# Patient Record
Sex: MALE | Race: WHITE | NOT HISPANIC OR LATINO | Employment: FULL TIME | URBAN - METROPOLITAN AREA
[De-identification: names, ages, dates, MRNs, and addresses within clinical notes are randomized per-mention and may not be internally consistent; named-entity substitution may affect disease eponyms.]

---

## 2022-09-19 ENCOUNTER — TELEPHONE (OUTPATIENT)
Dept: SCHEDULING | Facility: IMAGING CENTER | Age: 57
End: 2022-09-19

## 2022-10-07 ENCOUNTER — OFFICE VISIT (OUTPATIENT)
Dept: MEDICAL GROUP | Facility: PHYSICIAN GROUP | Age: 57
End: 2022-10-07
Payer: COMMERCIAL

## 2022-10-07 VITALS
WEIGHT: 167.5 LBS | TEMPERATURE: 98.1 F | BODY MASS INDEX: 27.91 KG/M2 | SYSTOLIC BLOOD PRESSURE: 108 MMHG | HEART RATE: 74 BPM | OXYGEN SATURATION: 96 % | HEIGHT: 65 IN | DIASTOLIC BLOOD PRESSURE: 70 MMHG

## 2022-10-07 DIAGNOSIS — Z00.00 PREVENTATIVE HEALTH CARE: ICD-10-CM

## 2022-10-07 DIAGNOSIS — G89.29 CHRONIC PAIN OF RIGHT KNEE: ICD-10-CM

## 2022-10-07 DIAGNOSIS — M25.561 CHRONIC PAIN OF RIGHT KNEE: ICD-10-CM

## 2022-10-07 DIAGNOSIS — Z95.5 S/P PRIMARY ANGIOPLASTY WITH CORONARY STENT: ICD-10-CM

## 2022-10-07 DIAGNOSIS — F17.210 TOBACCO DEPENDENCE DUE TO CIGARETTES: ICD-10-CM

## 2022-10-07 DIAGNOSIS — Z12.2 SCREENING FOR LUNG CANCER: ICD-10-CM

## 2022-10-07 DIAGNOSIS — Z12.5 PROSTATE CANCER SCREENING: ICD-10-CM

## 2022-10-07 DIAGNOSIS — Z23 NEED FOR VACCINATION: ICD-10-CM

## 2022-10-07 DIAGNOSIS — L57.0 ACTINIC KERATOSIS: ICD-10-CM

## 2022-10-07 DIAGNOSIS — Z12.11 COLON CANCER SCREENING: ICD-10-CM

## 2022-10-07 DIAGNOSIS — K42.9 UMBILICAL HERNIA WITHOUT OBSTRUCTION AND WITHOUT GANGRENE: ICD-10-CM

## 2022-10-07 DIAGNOSIS — I25.10 CORONARY ARTERY DISEASE INVOLVING NATIVE CORONARY ARTERY OF NATIVE HEART WITHOUT ANGINA PECTORIS: ICD-10-CM

## 2022-10-07 DIAGNOSIS — E78.2 MIXED HYPERLIPIDEMIA: ICD-10-CM

## 2022-10-07 PROCEDURE — 17000 DESTRUCT PREMALG LESION: CPT | Performed by: FAMILY MEDICINE

## 2022-10-07 PROCEDURE — 90471 IMMUNIZATION ADMIN: CPT | Performed by: FAMILY MEDICINE

## 2022-10-07 PROCEDURE — 99204 OFFICE O/P NEW MOD 45 MIN: CPT | Mod: 25 | Performed by: FAMILY MEDICINE

## 2022-10-07 PROCEDURE — 90715 TDAP VACCINE 7 YRS/> IM: CPT | Performed by: FAMILY MEDICINE

## 2022-10-07 RX ORDER — ASPIRIN 81 MG/1
81 TABLET ORAL DAILY
Qty: 90 TABLET | Refills: 3
Start: 2022-10-07

## 2022-10-07 RX ORDER — EZETIMIBE 10 MG/1
10 TABLET ORAL DAILY
Qty: 30 TABLET | Refills: 3 | Status: SHIPPED | OUTPATIENT
Start: 2022-10-07 | End: 2023-01-24

## 2022-10-07 ASSESSMENT — PATIENT HEALTH QUESTIONNAIRE - PHQ9: CLINICAL INTERPRETATION OF PHQ2 SCORE: 0

## 2022-10-07 NOTE — PROGRESS NOTES
"CHIEF COMPLAINT / REASON FOR VISIT  Armen Santiago is a 57 y.o. male that presents today to establish care.    HISTORY OF PRESENT ILLNESS  Right knee still bothering him since partial replacement two years ago. Sitting for long periods, or if he over exerts with walking. Tylenol is somewhat effective    Had vocal cord \"irregularity\", has hoarse voice, had laryngoscope in [place], was scheduled for surgery but timing didn't work out    Has cardiology appointment scheduled for October 31st.    - was on atorvastatin, memory was worse, so stopped 1.5 mo ago and saw improvement within a week or two    Past Medical History  Myocardial infarction in 2018 (s/p single stent)    Hx umbilical hernia repaired in childhood, returned 1-2 years ago. 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    PHYSICAL EXAM  Constitutional: Sitting comfortably, in no acute distress, responds to questions appropriately.  Head: Normocephalic  Eyes: No conjunctival injection, no scleral icterus, PERRL  Ears: External ear canals clear, TMs pearly grey with visualized bony landmarks and crisp light reflex  Mouth: Oral mucosa moist  Throat: Oropharynx clear without erythema or tonsillar exudates  Neck: No cervical lymphadenopathy  Heart: Regular S1 S2, no murmurs, rub, or gallops  Lungs: inspiratory stridor throughout, no wheezes, rales, or rhonchi  Extremities: No lower " extremity edema. 2+ symmetric radial pulses  MSK: Vertical scar anterior knee, no joint line tenderness, no patellar tenderness, no popliteal tenderness  Skin: Warm and dry, rough erythematous macule 4 mm right ear superior helix    ASSESSMENT & PLAN  1. Coronary artery disease involving native coronary artery of native heart without angina pectoris  2. S/P primary angioplasty with coronary stent  3. Mixed hyperlipidemia  History of MI in 2018, underwent single stent placement.  Did not tolerate atorvastatin due to memory issues.  Will initiate ezetimibe 10 mg daily.  Continues to take metoprolol tartrate 25 mg twice daily and aspirin 81 mg daily.  He is planning on establishing care with a cardiologist, first appointment later this month  - Lipid Profile; Future  - APOLIPOPROTEIN B; Future  - CBC WITH DIFFERENTIAL; Future  - Comp Metabolic Panel; Future  - ezetimibe (ZETIA) 10 MG Tab; Take 1 Tablet by mouth every day.  Dispense: 30 Tablet; Refill: 3    4. Umbilical hernia without obstruction and without gangrene  Nonreducible umbilical hernia, underwent repair many years ago but the hernia has reappeared.  He is interested in surgical repair  - Referral to General Surgery    5. Chronic pain of right knee  History of right partial knee replacement.  Has chronic pain that continues in that knee, is interested in orthopedics referral.  - DX-KNEE 3 VIEWS RIGHT; Future    6. Actinic keratosis  Right ear, performed cryotherapy (see procedure note)    7. Tobacco dependence due to cigarettes  Interested in quitting, will try Chantix  - varenicline (CHANTIX STARTING MONTH PAK) 0.5 MG X 11 & 1 MG X 42 tablet; Take 1 mg by mouth 2 times a day.  Dispense: 56 Each; Refill: 3    8. Screening for lung cancer  Current smoker with over 30-pack-year history.  - CT-CHEST LOW DOSE W/O; Future    9. Prostate cancer screening  - PROSTATE SPECIFIC AG SCREENING; Future    10. Colon cancer screening  Never had colon cancer screening  -  Referral to GI for Colonoscopy    11. Need for vaccination  - TDAP VACCINE =>8YO IM    12. Preventative health care  - Lipid Profile; Future  - APOLIPOPROTEIN B; Future  - CBC WITH DIFFERENTIAL; Future  - Comp Metabolic Panel; Future      Follow-up in 2 weeks after labs

## 2022-10-07 NOTE — PROCEDURES
Cryotherapy for actinic keratosis    Date/Time: 10/7/2022 11:50 AM  Performed by: Umesh Allen M.D.  Authorized by: Umesh Allen M.D.     Number of Lesions: 1  Lesion 1:     Body area: head/neck    Head/neck location: R ear    Initial size (mm): 4    Malignancy comment: pre-malignant (actinic keratosis)    Destruction method: cryotherapy      Cryo cycles: 2

## 2022-10-11 ENCOUNTER — APPOINTMENT (OUTPATIENT)
Dept: RADIOLOGY | Facility: MEDICAL CENTER | Age: 57
End: 2022-10-11
Attending: FAMILY MEDICINE
Payer: COMMERCIAL

## 2022-10-11 DIAGNOSIS — G89.29 CHRONIC PAIN OF RIGHT KNEE: ICD-10-CM

## 2022-10-11 DIAGNOSIS — M25.561 CHRONIC PAIN OF RIGHT KNEE: ICD-10-CM

## 2022-10-11 PROCEDURE — 73562 X-RAY EXAM OF KNEE 3: CPT | Mod: RT

## 2022-10-31 ENCOUNTER — HOSPITAL ENCOUNTER (OUTPATIENT)
Dept: LAB | Facility: MEDICAL CENTER | Age: 57
End: 2022-10-31
Attending: FAMILY MEDICINE
Payer: COMMERCIAL

## 2022-10-31 DIAGNOSIS — Z12.5 PROSTATE CANCER SCREENING: ICD-10-CM

## 2022-10-31 DIAGNOSIS — I25.10 CORONARY ARTERY DISEASE INVOLVING NATIVE CORONARY ARTERY OF NATIVE HEART WITHOUT ANGINA PECTORIS: ICD-10-CM

## 2022-10-31 DIAGNOSIS — Z00.00 PREVENTATIVE HEALTH CARE: ICD-10-CM

## 2022-10-31 DIAGNOSIS — E78.2 MIXED HYPERLIPIDEMIA: ICD-10-CM

## 2022-10-31 LAB
ALBUMIN SERPL BCP-MCNC: 4.4 G/DL (ref 3.2–4.9)
ALBUMIN/GLOB SERPL: 1.5 G/DL
ALP SERPL-CCNC: 67 U/L (ref 30–99)
ALT SERPL-CCNC: 20 U/L (ref 2–50)
ANION GAP SERPL CALC-SCNC: 11 MMOL/L (ref 7–16)
AST SERPL-CCNC: 16 U/L (ref 12–45)
BASOPHILS # BLD AUTO: 0.9 % (ref 0–1.8)
BASOPHILS # BLD: 0.07 K/UL (ref 0–0.12)
BILIRUB SERPL-MCNC: 0.4 MG/DL (ref 0.1–1.5)
BUN SERPL-MCNC: 14 MG/DL (ref 8–22)
CALCIUM SERPL-MCNC: 9.4 MG/DL (ref 8.5–10.5)
CHLORIDE SERPL-SCNC: 103 MMOL/L (ref 96–112)
CHOLEST SERPL-MCNC: 292 MG/DL (ref 100–199)
CO2 SERPL-SCNC: 20 MMOL/L (ref 20–33)
CREAT SERPL-MCNC: 0.71 MG/DL (ref 0.5–1.4)
EOSINOPHIL # BLD AUTO: 0.18 K/UL (ref 0–0.51)
EOSINOPHIL NFR BLD: 2.2 % (ref 0–6.9)
ERYTHROCYTE [DISTWIDTH] IN BLOOD BY AUTOMATED COUNT: 48.8 FL (ref 35.9–50)
FASTING STATUS PATIENT QL REPORTED: NORMAL
GFR SERPLBLD CREATININE-BSD FMLA CKD-EPI: 105 ML/MIN/1.73 M 2
GLOBULIN SER CALC-MCNC: 3 G/DL (ref 1.9–3.5)
GLUCOSE SERPL-MCNC: 99 MG/DL (ref 65–99)
HCT VFR BLD AUTO: 51.6 % (ref 42–52)
HDLC SERPL-MCNC: 30 MG/DL
HGB BLD-MCNC: 17.5 G/DL (ref 14–18)
IMM GRANULOCYTES # BLD AUTO: 0.03 K/UL (ref 0–0.11)
IMM GRANULOCYTES NFR BLD AUTO: 0.4 % (ref 0–0.9)
LDLC SERPL CALC-MCNC: 214 MG/DL
LYMPHOCYTES # BLD AUTO: 2.43 K/UL (ref 1–4.8)
LYMPHOCYTES NFR BLD: 30 % (ref 22–41)
MCH RBC QN AUTO: 33.5 PG (ref 27–33)
MCHC RBC AUTO-ENTMCNC: 33.9 G/DL (ref 33.7–35.3)
MCV RBC AUTO: 98.9 FL (ref 81.4–97.8)
MONOCYTES # BLD AUTO: 0.65 K/UL (ref 0–0.85)
MONOCYTES NFR BLD AUTO: 8 % (ref 0–13.4)
NEUTROPHILS # BLD AUTO: 4.75 K/UL (ref 1.82–7.42)
NEUTROPHILS NFR BLD: 58.5 % (ref 44–72)
NRBC # BLD AUTO: 0 K/UL
NRBC BLD-RTO: 0 /100 WBC
PLATELET # BLD AUTO: 324 K/UL (ref 164–446)
PMV BLD AUTO: 9.2 FL (ref 9–12.9)
POTASSIUM SERPL-SCNC: 4.4 MMOL/L (ref 3.6–5.5)
PROT SERPL-MCNC: 7.4 G/DL (ref 6–8.2)
PSA SERPL-MCNC: 1.03 NG/ML (ref 0–4)
RBC # BLD AUTO: 5.22 M/UL (ref 4.7–6.1)
SODIUM SERPL-SCNC: 134 MMOL/L (ref 135–145)
TRIGL SERPL-MCNC: 242 MG/DL (ref 0–149)
WBC # BLD AUTO: 8.1 K/UL (ref 4.8–10.8)

## 2022-10-31 PROCEDURE — 36415 COLL VENOUS BLD VENIPUNCTURE: CPT

## 2022-10-31 PROCEDURE — 82172 ASSAY OF APOLIPOPROTEIN: CPT

## 2022-10-31 PROCEDURE — 84153 ASSAY OF PSA TOTAL: CPT

## 2022-10-31 PROCEDURE — 80053 COMPREHEN METABOLIC PANEL: CPT

## 2022-10-31 PROCEDURE — 85025 COMPLETE CBC W/AUTO DIFF WBC: CPT

## 2022-10-31 PROCEDURE — 80061 LIPID PANEL: CPT

## 2022-11-02 LAB — APO B100 SERPL-MCNC: 182 MG/DL (ref 55–140)

## 2022-11-29 ENCOUNTER — APPOINTMENT (OUTPATIENT)
Dept: MEDICAL GROUP | Facility: PHYSICIAN GROUP | Age: 57
End: 2022-11-29
Payer: COMMERCIAL

## 2023-02-06 ENCOUNTER — APPOINTMENT (OUTPATIENT)
Dept: RADIOLOGY | Facility: IMAGING CENTER | Age: 58
End: 2023-02-06
Attending: FAMILY MEDICINE
Payer: COMMERCIAL

## 2023-02-06 ENCOUNTER — OFFICE VISIT (OUTPATIENT)
Dept: MEDICAL GROUP | Facility: PHYSICIAN GROUP | Age: 58
End: 2023-02-06
Payer: COMMERCIAL

## 2023-02-06 VITALS
OXYGEN SATURATION: 95 % | HEIGHT: 65 IN | RESPIRATION RATE: 14 BRPM | DIASTOLIC BLOOD PRESSURE: 82 MMHG | TEMPERATURE: 96.9 F | WEIGHT: 172 LBS | SYSTOLIC BLOOD PRESSURE: 134 MMHG | HEART RATE: 60 BPM | BODY MASS INDEX: 28.66 KG/M2

## 2023-02-06 DIAGNOSIS — E78.00 HYPERCHOLESTEROLEMIA WITH LDL GREATER THAN 190 MG/DL: ICD-10-CM

## 2023-02-06 DIAGNOSIS — E78.2 MIXED HYPERLIPIDEMIA: ICD-10-CM

## 2023-02-06 DIAGNOSIS — M25.511 CHRONIC RIGHT SHOULDER PAIN: ICD-10-CM

## 2023-02-06 DIAGNOSIS — I25.10 CORONARY ARTERY DISEASE INVOLVING NATIVE CORONARY ARTERY OF NATIVE HEART WITHOUT ANGINA PECTORIS: ICD-10-CM

## 2023-02-06 DIAGNOSIS — Z78.9 STATIN INTOLERANCE: ICD-10-CM

## 2023-02-06 DIAGNOSIS — D75.89 MACROCYTOSIS WITHOUT ANEMIA: ICD-10-CM

## 2023-02-06 DIAGNOSIS — G89.29 CHRONIC RIGHT SHOULDER PAIN: ICD-10-CM

## 2023-02-06 PROCEDURE — 99214 OFFICE O/P EST MOD 30 MIN: CPT | Performed by: FAMILY MEDICINE

## 2023-02-06 PROCEDURE — 73030 X-RAY EXAM OF SHOULDER: CPT | Mod: TC,RT | Performed by: NURSE PRACTITIONER

## 2023-02-06 RX ORDER — EVOLOCUMAB 420 MG/3.5
420 KIT SUBCUTANEOUS
Qty: 3.5 ML | Refills: 3 | Status: SHIPPED | OUTPATIENT
Start: 2023-02-06 | End: 2023-12-26

## 2023-02-06 ASSESSMENT — PATIENT HEALTH QUESTIONNAIRE - PHQ9: CLINICAL INTERPRETATION OF PHQ2 SCORE: 0

## 2023-02-06 ASSESSMENT — FIBROSIS 4 INDEX: FIB4 SCORE: 0.65

## 2023-02-06 NOTE — PROGRESS NOTES
"CHIEF COMPLAINT / REASON FOR VISIT  Armen Santiago is a 57 y.o. male with history of coronary artery disease (MI in 2018 status post stent) that presents today for lab follow-up    HISTORY OF PRESENT ILLNESS  Labs 10/31/2022:  CBC shows macrocytosis with MCV 98.9  CMP grossly normal  LDL-C 214, apo B 182  PSA 1.03    Zero to 6 drinks per night    Right shoulder pain, years. Worse in past couple months. Aggravated when he stops moving his shoulder. Wakes him up from sleep.    OBJECTIVE     /82 (BP Location: Right arm, Patient Position: Sitting, BP Cuff Size: Adult)   Pulse 60   Temp 36.1 °C (96.9 °F) (Temporal)   Resp 14   Ht 1.651 m (5' 5\")   Wt 78 kg (172 lb)   SpO2 95%   BMI 28.62 kg/m²  Body mass index is 28.62 kg/m².    PHYSICAL EXAM  Constitutional: Sitting comfortably, in no acute distress, responds to questions appropriately.  Right Shoulder:   -- Inspection: No erythema, scars, obvious deformities, or atrophy.  -- Tenderness to palpation at anterior joint line and bicipital groove  -- Active range of motion: 160 degrees shoulder flexion, 160 abduction, midthoracic internal  -- Normal sensation to light touch in upper extremities  -- 5/5 muscle strength in shoulder external rotation, internal rotation, abduction. 5/5 in biceps flexion and extension  -- Special tests: Negative Shelby, negative Neer, negative empty Can, positive Speed's, negative Yergason'    ASSESSMENT & PLAN  1. Coronary artery disease involving native coronary artery of native heart without angina pectoris  2. Hypercholesterolemia with LDL greater than 190 mg/dL  3. Mixed hyperlipidemia  4. Statin intolerance  Severe hypercholesterolemia with LDL-C 214 and patient with history of coronary artery disease and myocardial infarction.  His goal LDL-C is less than 55 mg/dL.  He did not tolerate statin due to adverse memory/cognition effects.  Currently only on ezetimibe 10 mg daily.  We will initiate Repatha 420 mg monthly to " achieve necessary LDL lowering.  - Evolocumab with Infusor (REPATHA PUSHTRONEX SYSTEM) 420 MG/3.5ML Solution Cartridge; Inject 420 mg under the skin every 30 (thirty) days.  Dispense: 3.5 mL; Refill: 3  - Lipid Profile; Future    5. Chronic right shoulder pain  Differential includes glenohumeral arthritis, rotator cuff tendinopathy, or bicipital tendinitis.  Will obtain radiographs for further evaluation.  Refer for physical therapy.  Next step in terms of referral/injection will depend on radiograph results  - DX-SHOULDER 2+ RIGHT; Future  - Referral to Physical Therapy    6. Macrocytosis without anemia  Suspect due to alcohol use, recommended he cut back or quit alcohol

## 2023-04-06 ENCOUNTER — OFFICE VISIT (OUTPATIENT)
Dept: SPORTS MEDICINE | Facility: CLINIC | Age: 58
End: 2023-04-06
Payer: COMMERCIAL

## 2023-04-06 ENCOUNTER — APPOINTMENT (OUTPATIENT)
Dept: RADIOLOGY | Facility: IMAGING CENTER | Age: 58
End: 2023-04-06
Attending: FAMILY MEDICINE
Payer: COMMERCIAL

## 2023-04-06 VITALS
TEMPERATURE: 98.8 F | RESPIRATION RATE: 18 BRPM | DIASTOLIC BLOOD PRESSURE: 80 MMHG | HEIGHT: 65 IN | BODY MASS INDEX: 28.66 KG/M2 | WEIGHT: 172 LBS | HEART RATE: 79 BPM | SYSTOLIC BLOOD PRESSURE: 118 MMHG | OXYGEN SATURATION: 94 %

## 2023-04-06 DIAGNOSIS — M25.512 ACUTE PAIN OF LEFT SHOULDER: ICD-10-CM

## 2023-04-06 PROCEDURE — 73030 X-RAY EXAM OF SHOULDER: CPT | Mod: TC,LT | Performed by: FAMILY MEDICINE

## 2023-04-06 PROCEDURE — 99213 OFFICE O/P EST LOW 20 MIN: CPT | Performed by: FAMILY MEDICINE

## 2023-04-06 ASSESSMENT — FIBROSIS 4 INDEX: FIB4 SCORE: 0.66
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
## 2023-04-06 NOTE — Clinical Note
Carlos Calvo, Thanks for referring Celio to our sports medicine clinic. You initially referred him for his right shoulder, but since he was having significant pain in the LEFT and his right shoulder was feeling better we focused on his left shoulder. I think he may have an acute tear of his infraspinatus. We have elected to see how he does with some exercises and formal therapy. We plan on seeing him back in about 3 weeks. If symptoms worsen, we may consider MRI study for assessment of the infraspinatus. Hope you are well! Respectfully,  FARIDA Navarro M.D. [facility] [phone]

## 2023-09-08 ENCOUNTER — TELEPHONE (OUTPATIENT)
Dept: HEALTH INFORMATION MANAGEMENT | Facility: OTHER | Age: 58
End: 2023-09-08

## 2023-09-08 DIAGNOSIS — M25.512 ACUTE PAIN OF LEFT SHOULDER: ICD-10-CM

## 2023-09-08 NOTE — TELEPHONE ENCOUNTER
"1. Caller Name: Armen Santiago                          Call Back Number: [phone]        How would the patient prefer to be contacted with a response: MyChart message    2. SPECIFIC Action To Be Taken: Referral pending, please sign.    3. Diagnosis/Clinical Reason for Request: Right Shoulder Pian - \"Hi would appreciate if I could get a referral for [facility] we initially went and saw the doc that was referred by Dr. Allen we were not impressed and got nowhere in regards to dealing with the shoulder pain\"    4. Specialty & Provider Name/Lab/Imaging Location: [facility]     5. Is appointment scheduled for requested order/referral: no    Patient was informed they will receive a return phone call from the office ONLY if there are any questions before processing their request. Advised to call back if they haven't received a call from the referral department in 5 days.  "

## 2023-12-22 PROBLEM — S46.011A TRAUMATIC TEAR OF RIGHT ROTATOR CUFF, INITIAL ENCOUNTER: Status: ACTIVE | Noted: 2023-12-22

## 2023-12-22 PROBLEM — M75.41 SUBACROMIAL IMPINGEMENT OF RIGHT SHOULDER: Status: ACTIVE | Noted: 2023-12-22

## 2023-12-22 PROBLEM — M75.21 BICEPS TENDONITIS ON RIGHT: Status: ACTIVE | Noted: 2023-12-22

## 2023-12-22 PROBLEM — S43.431A GLENOID LABRAL TEAR, RIGHT, INITIAL ENCOUNTER: Status: ACTIVE | Noted: 2023-12-22

## 2023-12-26 ASSESSMENT — FIBROSIS 4 INDEX: FIB4 SCORE: 0.66

## 2024-01-09 ENCOUNTER — OFFICE VISIT (OUTPATIENT)
Dept: MEDICAL GROUP | Facility: PHYSICIAN GROUP | Age: 59
End: 2024-01-09
Payer: COMMERCIAL

## 2024-01-09 VITALS
BODY MASS INDEX: 27.64 KG/M2 | RESPIRATION RATE: 14 BRPM | OXYGEN SATURATION: 95 % | SYSTOLIC BLOOD PRESSURE: 108 MMHG | WEIGHT: 172 LBS | HEART RATE: 67 BPM | TEMPERATURE: 97.3 F | HEIGHT: 66 IN | DIASTOLIC BLOOD PRESSURE: 62 MMHG

## 2024-01-09 DIAGNOSIS — M25.511 BILATERAL SHOULDER PAIN, UNSPECIFIED CHRONICITY: ICD-10-CM

## 2024-01-09 DIAGNOSIS — Z78.9 STATIN INTOLERANCE: ICD-10-CM

## 2024-01-09 DIAGNOSIS — M25.512 BILATERAL SHOULDER PAIN, UNSPECIFIED CHRONICITY: ICD-10-CM

## 2024-01-09 DIAGNOSIS — Z23 NEED FOR VACCINATION: ICD-10-CM

## 2024-01-09 DIAGNOSIS — F11.90 OPIATE USE: ICD-10-CM

## 2024-01-09 DIAGNOSIS — I25.10 CORONARY ARTERY DISEASE INVOLVING NATIVE CORONARY ARTERY OF NATIVE HEART WITHOUT ANGINA PECTORIS: ICD-10-CM

## 2024-01-09 DIAGNOSIS — E78.00 HYPERCHOLESTEROLEMIA WITH LDL GREATER THAN 190 MG/DL: ICD-10-CM

## 2024-01-09 PROCEDURE — 3074F SYST BP LT 130 MM HG: CPT | Performed by: FAMILY MEDICINE

## 2024-01-09 PROCEDURE — 99214 OFFICE O/P EST MOD 30 MIN: CPT | Mod: 25 | Performed by: FAMILY MEDICINE

## 2024-01-09 PROCEDURE — 3078F DIAST BP <80 MM HG: CPT | Performed by: FAMILY MEDICINE

## 2024-01-09 PROCEDURE — 90686 IIV4 VACC NO PRSV 0.5 ML IM: CPT | Performed by: FAMILY MEDICINE

## 2024-01-09 PROCEDURE — 90471 IMMUNIZATION ADMIN: CPT | Performed by: FAMILY MEDICINE

## 2024-01-09 RX ORDER — EVOLOCUMAB 140 MG/ML
140 INJECTION, SOLUTION SUBCUTANEOUS
Qty: 2 ML | Refills: 3 | Status: SHIPPED | OUTPATIENT
Start: 2024-01-09 | End: 2024-03-08 | Stop reason: SDUPTHER

## 2024-01-09 RX ORDER — TRAMADOL HYDROCHLORIDE 50 MG/1
50 TABLET ORAL 2 TIMES DAILY PRN
Qty: 60 TABLET | Refills: 0 | Status: SHIPPED | OUTPATIENT
Start: 2024-01-09 | End: 2024-01-29 | Stop reason: SDUPTHER

## 2024-01-09 ASSESSMENT — PATIENT HEALTH QUESTIONNAIRE - PHQ9: CLINICAL INTERPRETATION OF PHQ2 SCORE: 0

## 2024-01-09 ASSESSMENT — FIBROSIS 4 INDEX: FIB4 SCORE: 0.66

## 2024-01-09 NOTE — PROGRESS NOTES
"CHIEF COMPLAINT / REASON FOR VISIT  Armen Santiago is a 58 y.o. male that presents today for   Chief Complaint   Patient presents with    Medical Clearance     Shoulder surg      HISTORY OF PRESENT ILLNESS  MRI 11/6/2023 right shoulder showed acute full tear of supraspinatus and subscapularis, medial dislocation of long head of biceps tendon out of the bicipital groove, infraspinatus tear, and glenohumeral/acromioclavicular arthritis.  MRI 11/6/2023 left shoulder showed chronic rotator cuff tear.  He saw orthopedics and recommended reverse total shoulder arthroplasty for the left, and scope with rotator cuff repair and biceps tenodesis on the right.    He has tried ibuprofen and celebrex    Denies exertional chest pain or dyspnea    OBJECTIVE     /62 (BP Location: Left arm, Patient Position: Sitting, BP Cuff Size: Adult)   Pulse 67   Temp 36.3 °C (97.3 °F) (Temporal)   Resp 14   Ht 1.676 m (5' 6\")   Wt 78 kg (172 lb)   SpO2 95%   BMI 27.76 kg/m²      PHYSICAL EXAM  Constitutional: Sitting comfortably, in no acute distress, responds to questions appropriately.  Skin: Warm and dry, no rashes or lesions on face or exposed upper extremities    ASSESSMENT & PLAN  1. Bilateral shoulder pain, unspecified chronicity  2. Opiate use  Chronic bilateral severe shoulder pain, uncontrolled with Celebrex and Tylenol.  He is requesting tramadol to bridge him to his upcoming orthopedic shoulder surgery.  After discussion decided to proceed with tramadol 50 mg twice daily.  He is aware of associated risks  - traMADol (ULTRAM) 50 MG Tab; Take 1 Tablet by mouth 2 times a day as needed for Severe Pain for up to 30 days.  Dispense: 60 Tablet; Refill: 0  - Consent for Opiate Prescription  - Controlled Substance Treatment Agreement    3. Hypercholesterolemia with LDL greater than 190 mg/dL  4. Coronary artery disease involving native coronary artery of native heart without angina pectoris  5. Statin intolerance  Severe " hypercholesterolemia with LDL-C 214 and patient with history of coronary artery disease and myocardial infarction.  His goal LDL-C is less than 55 mg/dL.  He did not tolerate statin due to adverse memory/cognition effects.  Currently only on ezetimibe 10 mg daily.  We will initiate Repatha injections every 14 days for secondary ASCVD prevention, recheck lipids in 3 months. Will refer to get re-established with cardiology.   - Evolocumab (REPATHA) Solution Prefilled Syringe SubQ injection syringe; Inject 1 mL under the skin every 14 days.  Dispense: 2 mL; Refill: 3  - Lipid Profile; Future  - APOLIPOPROTEIN B; Future  - REFERRAL TO CARDIOLOGY  - CBC WITH DIFFERENTIAL; Future    6. Need for vaccination  - INFLUENZA VACCINE QUAD INJ (PF)

## 2024-01-11 ENCOUNTER — TELEPHONE (OUTPATIENT)
Dept: MEDICAL GROUP | Facility: PHYSICIAN GROUP | Age: 59
End: 2024-01-11

## 2024-01-11 NOTE — TELEPHONE ENCOUNTER
VOICEMAIL  1. Caller Name: Armen Santiago                      Call Back Number: [phone] (home) [phone] (work)      2. Message: Patient is looking to get his clearance for [facility] signed. They state that [facility] has sent a request through [employer]    3. Patient approves office to leave a detailed voicemail/MyChart message: yes

## 2024-01-11 NOTE — TELEPHONE ENCOUNTER
Phone Number Called: [phone]    Call outcome:  Left voicemail to [facility]    Message: Called [facility] and inform them that we have not received a preop clearance form for the patient.  I provided both our front office fax number and our centralized fax number.

## 2024-01-12 ENCOUNTER — HOSPITAL ENCOUNTER (OUTPATIENT)
Dept: LAB | Facility: MEDICAL CENTER | Age: 59
End: 2024-01-12
Attending: FAMILY MEDICINE
Payer: COMMERCIAL

## 2024-01-12 DIAGNOSIS — I25.10 CORONARY ARTERY DISEASE INVOLVING NATIVE CORONARY ARTERY OF NATIVE HEART WITHOUT ANGINA PECTORIS: ICD-10-CM

## 2024-01-12 DIAGNOSIS — E78.00 HYPERCHOLESTEROLEMIA WITH LDL GREATER THAN 190 MG/DL: ICD-10-CM

## 2024-01-12 DIAGNOSIS — Z78.9 STATIN INTOLERANCE: ICD-10-CM

## 2024-01-12 LAB
BASOPHILS # BLD AUTO: 0.7 % (ref 0–1.8)
BASOPHILS # BLD: 0.05 K/UL (ref 0–0.12)
CHOLEST SERPL-MCNC: 275 MG/DL (ref 100–199)
EOSINOPHIL # BLD AUTO: 0.21 K/UL (ref 0–0.51)
EOSINOPHIL NFR BLD: 3 % (ref 0–6.9)
ERYTHROCYTE [DISTWIDTH] IN BLOOD BY AUTOMATED COUNT: 47.4 FL (ref 35.9–50)
FASTING STATUS PATIENT QL REPORTED: NORMAL
HCT VFR BLD AUTO: 52 % (ref 42–52)
HDLC SERPL-MCNC: 30 MG/DL
HGB BLD-MCNC: 17.4 G/DL (ref 14–18)
IMM GRANULOCYTES # BLD AUTO: 0.03 K/UL (ref 0–0.11)
IMM GRANULOCYTES NFR BLD AUTO: 0.4 % (ref 0–0.9)
LDLC SERPL CALC-MCNC: 192 MG/DL
LYMPHOCYTES # BLD AUTO: 1.89 K/UL (ref 1–4.8)
LYMPHOCYTES NFR BLD: 27 % (ref 22–41)
MCH RBC QN AUTO: 33.3 PG (ref 27–33)
MCHC RBC AUTO-ENTMCNC: 33.5 G/DL (ref 32.3–36.5)
MCV RBC AUTO: 99.6 FL (ref 81.4–97.8)
MONOCYTES # BLD AUTO: 0.67 K/UL (ref 0–0.85)
MONOCYTES NFR BLD AUTO: 9.6 % (ref 0–13.4)
NEUTROPHILS # BLD AUTO: 4.15 K/UL (ref 1.82–7.42)
NEUTROPHILS NFR BLD: 59.3 % (ref 44–72)
NRBC # BLD AUTO: 0 K/UL
NRBC BLD-RTO: 0 /100 WBC (ref 0–0.2)
PLATELET # BLD AUTO: 280 K/UL (ref 164–446)
PMV BLD AUTO: 9.5 FL (ref 9–12.9)
RBC # BLD AUTO: 5.22 M/UL (ref 4.7–6.1)
TRIGL SERPL-MCNC: 264 MG/DL (ref 0–149)
WBC # BLD AUTO: 7 K/UL (ref 4.8–10.8)

## 2024-01-12 PROCEDURE — 36415 COLL VENOUS BLD VENIPUNCTURE: CPT

## 2024-01-12 PROCEDURE — 80061 LIPID PANEL: CPT

## 2024-01-12 PROCEDURE — 85025 COMPLETE CBC W/AUTO DIFF WBC: CPT

## 2024-01-12 PROCEDURE — 82172 ASSAY OF APOLIPOPROTEIN: CPT

## 2024-01-13 LAB — APO B100 SERPL-MCNC: 177 MG/DL (ref 66–133)

## 2024-01-15 ENCOUNTER — TELEPHONE (OUTPATIENT)
Dept: MEDICAL GROUP | Facility: PHYSICIAN GROUP | Age: 59
End: 2024-01-15

## 2024-01-16 DIAGNOSIS — I25.10 CORONARY ARTERY DISEASE INVOLVING NATIVE CORONARY ARTERY OF NATIVE HEART WITHOUT ANGINA PECTORIS: ICD-10-CM

## 2024-01-16 NOTE — TELEPHONE ENCOUNTER
Phone Number Called: [phone]     Call outcome:  Left a detailed message to Tere's voicemail    Message: Informed them that the preop clearance form has been faxed out to them.

## 2024-01-16 NOTE — TELEPHONE ENCOUNTER
----- Message from Tere Michael, Med Ass't sent at 1/15/2024  3:58 PM PST -----  Sanchez Alexandra this is Tere from [facility]'s office. I have faxed the requested paperwork over a few times to both numbers you have provided [phone] and [phone]. These were faxed on 1.10.2024,1.4.2024,12.28.2023. I believe your office is part of [facility] and that you are able to access the requested documents under letters or provided a dictated note stating this patient is cleared for surgery. If he is indeed not cleared to proceed please notify the patient.     I have also called your office and have personally left a voicemail or two and have never gotten a call back. This patient is in pain and would like to resolve the problem.     Thank You   Tere   [phone]

## 2024-01-17 RX ORDER — EZETIMIBE 10 MG/1
10 TABLET ORAL DAILY
Qty: 90 TABLET | Refills: 3 | Status: SHIPPED | OUTPATIENT
Start: 2024-01-17

## 2024-01-22 ENCOUNTER — APPOINTMENT (OUTPATIENT)
Dept: ADMISSIONS | Facility: MEDICAL CENTER | Age: 59
End: 2024-01-22
Attending: ORTHOPAEDIC SURGERY
Payer: COMMERCIAL

## 2024-01-26 ENCOUNTER — TELEPHONE (OUTPATIENT)
Dept: HEALTH INFORMATION MANAGEMENT | Facility: OTHER | Age: 59
End: 2024-01-26
Payer: COMMERCIAL

## 2024-01-29 DIAGNOSIS — M25.512 BILATERAL SHOULDER PAIN, UNSPECIFIED CHRONICITY: ICD-10-CM

## 2024-01-29 DIAGNOSIS — M25.511 BILATERAL SHOULDER PAIN, UNSPECIFIED CHRONICITY: ICD-10-CM

## 2024-01-29 RX ORDER — TRAMADOL HYDROCHLORIDE 50 MG/1
50 TABLET ORAL 2 TIMES DAILY PRN
Qty: 60 TABLET | Refills: 0 | Status: ON HOLD | OUTPATIENT
Start: 2024-01-29 | End: 2024-02-20

## 2024-01-29 NOTE — TELEPHONE ENCOUNTER
Received request via: Pharmacy    Was the patient seen in the last year in this department? Yes    Does the patient have an active prescription (recently filled or refills available) for medication(s) requested? No    Pharmacy Name: [facility] [place], [place]    Does the patient have alf Plus and need 100 day supply (blood pressure, diabetes and cholesterol meds only)? Patient does not have SCP

## 2024-02-07 ENCOUNTER — PRE-ADMISSION TESTING (OUTPATIENT)
Dept: ADMISSIONS | Facility: MEDICAL CENTER | Age: 59
End: 2024-02-07
Attending: ORTHOPAEDIC SURGERY
Payer: COMMERCIAL

## 2024-02-07 VITALS — BODY MASS INDEX: 27.76 KG/M2 | HEIGHT: 66 IN

## 2024-02-07 NOTE — PREPROCEDURE INSTRUCTIONS
Pt preadmitted via phone, instructions emailed. Questions answered.Patient instructed per pharmacy guidelines regarding taking, holding or contacting provider for instructions on regularly prescribed medications before surgery. Instructed to take the following medications the day of surgery with a sip of water per pharmacy medication guidelines- metoprolol and if needed - flexeril and tramadol. METs score >4.

## 2024-02-20 ENCOUNTER — ANESTHESIA (OUTPATIENT)
Dept: SURGERY | Facility: MEDICAL CENTER | Age: 59
End: 2024-02-20
Payer: COMMERCIAL

## 2024-02-20 ENCOUNTER — HOSPITAL ENCOUNTER (OUTPATIENT)
Facility: MEDICAL CENTER | Age: 59
End: 2024-02-20
Attending: ORTHOPAEDIC SURGERY | Admitting: ORTHOPAEDIC SURGERY
Payer: COMMERCIAL

## 2024-02-20 ENCOUNTER — ANESTHESIA EVENT (OUTPATIENT)
Dept: SURGERY | Facility: MEDICAL CENTER | Age: 59
End: 2024-02-20
Payer: COMMERCIAL

## 2024-02-20 VITALS
SYSTOLIC BLOOD PRESSURE: 147 MMHG | BODY MASS INDEX: 27.9 KG/M2 | HEIGHT: 65 IN | OXYGEN SATURATION: 92 % | RESPIRATION RATE: 18 BRPM | HEART RATE: 75 BPM | DIASTOLIC BLOOD PRESSURE: 81 MMHG | TEMPERATURE: 96.8 F | WEIGHT: 167.44 LBS

## 2024-02-20 DIAGNOSIS — M75.21 BICEPS TENDONITIS ON RIGHT: ICD-10-CM

## 2024-02-20 DIAGNOSIS — S46.011A TRAUMATIC TEAR OF RIGHT ROTATOR CUFF, INITIAL ENCOUNTER: ICD-10-CM

## 2024-02-20 DIAGNOSIS — M75.41 SUBACROMIAL IMPINGEMENT OF RIGHT SHOULDER: ICD-10-CM

## 2024-02-20 DIAGNOSIS — S43.431A GLENOID LABRAL TEAR, RIGHT, INITIAL ENCOUNTER: ICD-10-CM

## 2024-02-20 DIAGNOSIS — G89.18 POST-OP PAIN: ICD-10-CM

## 2024-02-20 PROCEDURE — 160025 RECOVERY II MINUTES (STATS): Performed by: ORTHOPAEDIC SURGERY

## 2024-02-20 PROCEDURE — 160048 HCHG OR STATISTICAL LEVEL 1-5: Performed by: ORTHOPAEDIC SURGERY

## 2024-02-20 PROCEDURE — 160036 HCHG PACU - EA ADDL 30 MINS PHASE I: Performed by: ORTHOPAEDIC SURGERY

## 2024-02-20 PROCEDURE — 700105 HCHG RX REV CODE 258: Performed by: ORTHOPAEDIC SURGERY

## 2024-02-20 PROCEDURE — 29827 SHO ARTHRS SRG RT8TR CUF RPR: CPT | Mod: RT | Performed by: ORTHOPAEDIC SURGERY

## 2024-02-20 PROCEDURE — 700101 HCHG RX REV CODE 250: Performed by: ORTHOPAEDIC SURGERY

## 2024-02-20 PROCEDURE — 160046 HCHG PACU - 1ST 60 MINS PHASE II: Performed by: ORTHOPAEDIC SURGERY

## 2024-02-20 PROCEDURE — 700101 HCHG RX REV CODE 250: Performed by: STUDENT IN AN ORGANIZED HEALTH CARE EDUCATION/TRAINING PROGRAM

## 2024-02-20 PROCEDURE — 64415 NJX AA&/STRD BRCH PLXS IMG: CPT | Performed by: ORTHOPAEDIC SURGERY

## 2024-02-20 PROCEDURE — 23430 REPAIR BICEPS TENDON: CPT | Mod: RT | Performed by: ORTHOPAEDIC SURGERY

## 2024-02-20 PROCEDURE — A9270 NON-COVERED ITEM OR SERVICE: HCPCS | Performed by: STUDENT IN AN ORGANIZED HEALTH CARE EDUCATION/TRAINING PROGRAM

## 2024-02-20 PROCEDURE — 700111 HCHG RX REV CODE 636 W/ 250 OVERRIDE (IP): Mod: JZ | Performed by: STUDENT IN AN ORGANIZED HEALTH CARE EDUCATION/TRAINING PROGRAM

## 2024-02-20 PROCEDURE — 29826 SHO ARTHRS SRG DECOMPRESSION: CPT | Mod: ASROC,RT | Performed by: PHYSICIAN ASSISTANT

## 2024-02-20 PROCEDURE — 29823 SHO ARTHRS SRG XTNSV DBRDMT: CPT | Mod: RT | Performed by: ORTHOPAEDIC SURGERY

## 2024-02-20 PROCEDURE — C1713 ANCHOR/SCREW BN/BN,TIS/BN: HCPCS | Performed by: ORTHOPAEDIC SURGERY

## 2024-02-20 PROCEDURE — 160029 HCHG SURGERY MINUTES - 1ST 30 MINS LEVEL 4: Performed by: ORTHOPAEDIC SURGERY

## 2024-02-20 PROCEDURE — 23430 REPAIR BICEPS TENDON: CPT | Mod: ASROC,RT | Performed by: PHYSICIAN ASSISTANT

## 2024-02-20 PROCEDURE — 29826 SHO ARTHRS SRG DECOMPRESSION: CPT | Mod: RT | Performed by: ORTHOPAEDIC SURGERY

## 2024-02-20 PROCEDURE — 29827 SHO ARTHRS SRG RT8TR CUF RPR: CPT | Mod: ASROC,RT | Performed by: PHYSICIAN ASSISTANT

## 2024-02-20 PROCEDURE — 160002 HCHG RECOVERY MINUTES (STAT): Performed by: ORTHOPAEDIC SURGERY

## 2024-02-20 PROCEDURE — 700111 HCHG RX REV CODE 636 W/ 250 OVERRIDE (IP): Mod: JZ | Performed by: ORTHOPAEDIC SURGERY

## 2024-02-20 PROCEDURE — 502000 HCHG MISC OR IMPLANTS RC 0278: Performed by: ORTHOPAEDIC SURGERY

## 2024-02-20 PROCEDURE — 160035 HCHG PACU - 1ST 60 MINS PHASE I: Performed by: ORTHOPAEDIC SURGERY

## 2024-02-20 PROCEDURE — 160009 HCHG ANES TIME/MIN: Performed by: ORTHOPAEDIC SURGERY

## 2024-02-20 PROCEDURE — 29823 SHO ARTHRS SRG XTNSV DBRDMT: CPT | Mod: ASROC,RT | Performed by: PHYSICIAN ASSISTANT

## 2024-02-20 PROCEDURE — 160041 HCHG SURGERY MINUTES - EA ADDL 1 MIN LEVEL 4: Performed by: ORTHOPAEDIC SURGERY

## 2024-02-20 PROCEDURE — 700102 HCHG RX REV CODE 250 W/ 637 OVERRIDE(OP): Performed by: STUDENT IN AN ORGANIZED HEALTH CARE EDUCATION/TRAINING PROGRAM

## 2024-02-20 PROCEDURE — 700111 HCHG RX REV CODE 636 W/ 250 OVERRIDE (IP): Performed by: PHYSICIAN ASSISTANT

## 2024-02-20 DEVICE — IMPLANTABLE DEVICE: Type: IMPLANTABLE DEVICE | Site: SHOULDER | Status: FUNCTIONAL

## 2024-02-20 DEVICE — ANCHOR SUTURE ICONIX 2 WITH #2 FORCE FIBER 2.3MM (5EA/BX): Type: IMPLANTABLE DEVICE | Site: SHOULDER | Status: FUNCTIONAL

## 2024-02-20 RX ORDER — BUPIVACAINE HYDROCHLORIDE AND EPINEPHRINE 2.5; 5 MG/ML; UG/ML
INJECTION, SOLUTION EPIDURAL; INFILTRATION; INTRACAUDAL; PERINEURAL
Status: DISCONTINUED | OUTPATIENT
Start: 2024-02-20 | End: 2024-02-20 | Stop reason: HOSPADM

## 2024-02-20 RX ORDER — EPHEDRINE SULFATE 50 MG/ML
5 INJECTION, SOLUTION INTRAVENOUS
Status: DISCONTINUED | OUTPATIENT
Start: 2024-02-20 | End: 2024-02-20 | Stop reason: HOSPADM

## 2024-02-20 RX ORDER — EPHEDRINE SULFATE 50 MG/ML
INJECTION, SOLUTION INTRAVENOUS PRN
Status: DISCONTINUED | OUTPATIENT
Start: 2024-02-20 | End: 2024-02-20 | Stop reason: SURG

## 2024-02-20 RX ORDER — BUPIVACAINE HYDROCHLORIDE AND EPINEPHRINE 5; 5 MG/ML; UG/ML
INJECTION, SOLUTION EPIDURAL; INTRACAUDAL; PERINEURAL
Status: COMPLETED | OUTPATIENT
Start: 2024-02-20 | End: 2024-02-20

## 2024-02-20 RX ORDER — MEPERIDINE HYDROCHLORIDE 25 MG/ML
12.5 INJECTION INTRAMUSCULAR; INTRAVENOUS; SUBCUTANEOUS
Status: DISCONTINUED | OUTPATIENT
Start: 2024-02-20 | End: 2024-02-20 | Stop reason: HOSPADM

## 2024-02-20 RX ORDER — SODIUM CHLORIDE, SODIUM LACTATE, POTASSIUM CHLORIDE, CALCIUM CHLORIDE 600; 310; 30; 20 MG/100ML; MG/100ML; MG/100ML; MG/100ML
INJECTION, SOLUTION INTRAVENOUS CONTINUOUS
Status: DISCONTINUED | OUTPATIENT
Start: 2024-02-20 | End: 2024-02-20 | Stop reason: HOSPADM

## 2024-02-20 RX ORDER — ONDANSETRON 2 MG/ML
INJECTION INTRAMUSCULAR; INTRAVENOUS PRN
Status: DISCONTINUED | OUTPATIENT
Start: 2024-02-20 | End: 2024-02-20 | Stop reason: SURG

## 2024-02-20 RX ORDER — DIPHENHYDRAMINE HYDROCHLORIDE 50 MG/ML
12.5 INJECTION INTRAMUSCULAR; INTRAVENOUS
Status: DISCONTINUED | OUTPATIENT
Start: 2024-02-20 | End: 2024-02-20 | Stop reason: HOSPADM

## 2024-02-20 RX ORDER — OXYCODONE HYDROCHLORIDE 5 MG/1
5-10 TABLET ORAL EVERY 6 HOURS PRN
Qty: 40 TABLET | Refills: 0 | Status: SHIPPED | OUTPATIENT
Start: 2024-02-20 | End: 2024-02-27

## 2024-02-20 RX ORDER — NAPROXEN 500 MG/1
500 TABLET ORAL 2 TIMES DAILY WITH MEALS
Qty: 10 TABLET | Refills: 0 | Status: SHIPPED | OUTPATIENT
Start: 2024-02-20 | End: 2024-02-25

## 2024-02-20 RX ORDER — ONDANSETRON 2 MG/ML
4 INJECTION INTRAMUSCULAR; INTRAVENOUS
Status: DISCONTINUED | OUTPATIENT
Start: 2024-02-20 | End: 2024-02-20 | Stop reason: HOSPADM

## 2024-02-20 RX ORDER — PHENYLEPHRINE HYDROCHLORIDE 10 MG/ML
INJECTION, SOLUTION INTRAMUSCULAR; INTRAVENOUS; SUBCUTANEOUS PRN
Status: DISCONTINUED | OUTPATIENT
Start: 2024-02-20 | End: 2024-02-20

## 2024-02-20 RX ORDER — PHENYLEPHRINE HYDROCHLORIDE 10 MG/ML
INJECTION, SOLUTION INTRAMUSCULAR; INTRAVENOUS; SUBCUTANEOUS PRN
Status: DISCONTINUED | OUTPATIENT
Start: 2024-02-20 | End: 2024-02-20 | Stop reason: SURG

## 2024-02-20 RX ORDER — ROCURONIUM BROMIDE 10 MG/ML
INJECTION, SOLUTION INTRAVENOUS PRN
Status: DISCONTINUED | OUTPATIENT
Start: 2024-02-20 | End: 2024-02-20 | Stop reason: SURG

## 2024-02-20 RX ORDER — TRANEXAMIC ACID 100 MG/ML
INJECTION, SOLUTION INTRAVENOUS PRN
Status: DISCONTINUED | OUTPATIENT
Start: 2024-02-20 | End: 2024-02-20 | Stop reason: SURG

## 2024-02-20 RX ORDER — CEFAZOLIN SODIUM 1 G/3ML
2 INJECTION, POWDER, FOR SOLUTION INTRAMUSCULAR; INTRAVENOUS ONCE
Status: COMPLETED | OUTPATIENT
Start: 2024-02-20 | End: 2024-02-20

## 2024-02-20 RX ORDER — HYDRALAZINE HYDROCHLORIDE 20 MG/ML
5 INJECTION INTRAMUSCULAR; INTRAVENOUS
Status: DISCONTINUED | OUTPATIENT
Start: 2024-02-20 | End: 2024-02-20 | Stop reason: HOSPADM

## 2024-02-20 RX ORDER — GABAPENTIN 300 MG/1
300 CAPSULE ORAL ONCE
Status: COMPLETED | OUTPATIENT
Start: 2024-02-20 | End: 2024-02-20

## 2024-02-20 RX ORDER — HYDROMORPHONE HYDROCHLORIDE 1 MG/ML
0.4 INJECTION, SOLUTION INTRAMUSCULAR; INTRAVENOUS; SUBCUTANEOUS
Status: DISCONTINUED | OUTPATIENT
Start: 2024-02-20 | End: 2024-02-20 | Stop reason: HOSPADM

## 2024-02-20 RX ORDER — DEXAMETHASONE SODIUM PHOSPHATE 4 MG/ML
INJECTION, SOLUTION INTRA-ARTICULAR; INTRALESIONAL; INTRAMUSCULAR; INTRAVENOUS; SOFT TISSUE PRN
Status: DISCONTINUED | OUTPATIENT
Start: 2024-02-20 | End: 2024-02-20 | Stop reason: SURG

## 2024-02-20 RX ORDER — LIDOCAINE HYDROCHLORIDE 20 MG/ML
INJECTION, SOLUTION EPIDURAL; INFILTRATION; INTRACAUDAL; PERINEURAL PRN
Status: DISCONTINUED | OUTPATIENT
Start: 2024-02-20 | End: 2024-02-20 | Stop reason: SURG

## 2024-02-20 RX ORDER — SODIUM CHLORIDE, SODIUM LACTATE, POTASSIUM CHLORIDE, CALCIUM CHLORIDE 600; 310; 30; 20 MG/100ML; MG/100ML; MG/100ML; MG/100ML
INJECTION, SOLUTION INTRAVENOUS CONTINUOUS
Status: ACTIVE | OUTPATIENT
Start: 2024-02-20 | End: 2024-02-20

## 2024-02-20 RX ORDER — HYDROMORPHONE HYDROCHLORIDE 1 MG/ML
0.1 INJECTION, SOLUTION INTRAMUSCULAR; INTRAVENOUS; SUBCUTANEOUS
Status: DISCONTINUED | OUTPATIENT
Start: 2024-02-20 | End: 2024-02-20 | Stop reason: HOSPADM

## 2024-02-20 RX ORDER — HALOPERIDOL 5 MG/ML
1 INJECTION INTRAMUSCULAR
Status: DISCONTINUED | OUTPATIENT
Start: 2024-02-20 | End: 2024-02-20 | Stop reason: HOSPADM

## 2024-02-20 RX ORDER — HYDROMORPHONE HYDROCHLORIDE 1 MG/ML
0.2 INJECTION, SOLUTION INTRAMUSCULAR; INTRAVENOUS; SUBCUTANEOUS
Status: DISCONTINUED | OUTPATIENT
Start: 2024-02-20 | End: 2024-02-20 | Stop reason: HOSPADM

## 2024-02-20 RX ORDER — ACETAMINOPHEN 500 MG
1000 TABLET ORAL ONCE
Status: COMPLETED | OUTPATIENT
Start: 2024-02-20 | End: 2024-02-20

## 2024-02-20 RX ORDER — OXYCODONE HCL 5 MG/5 ML
10 SOLUTION, ORAL ORAL
Status: COMPLETED | OUTPATIENT
Start: 2024-02-20 | End: 2024-02-20

## 2024-02-20 RX ORDER — ONDANSETRON 4 MG/1
4 TABLET, FILM COATED ORAL EVERY 4 HOURS PRN
Qty: 20 TABLET | Refills: 0 | Status: SHIPPED | OUTPATIENT
Start: 2024-02-20

## 2024-02-20 RX ORDER — EPINEPHRINE 1 MG/ML(1)
AMPUL (ML) INJECTION
Status: DISCONTINUED | OUTPATIENT
Start: 2024-02-20 | End: 2024-02-20 | Stop reason: HOSPADM

## 2024-02-20 RX ORDER — OXYCODONE HCL 5 MG/5 ML
5 SOLUTION, ORAL ORAL
Status: COMPLETED | OUTPATIENT
Start: 2024-02-20 | End: 2024-02-20

## 2024-02-20 RX ORDER — LABETALOL HYDROCHLORIDE 5 MG/ML
5 INJECTION, SOLUTION INTRAVENOUS
Status: DISCONTINUED | OUTPATIENT
Start: 2024-02-20 | End: 2024-02-20 | Stop reason: HOSPADM

## 2024-02-20 RX ADMIN — EPHEDRINE SULFATE 5 MG: 50 INJECTION, SOLUTION INTRAVENOUS at 07:54

## 2024-02-20 RX ADMIN — CEFAZOLIN 2 G: 1 INJECTION, POWDER, FOR SOLUTION INTRAMUSCULAR; INTRAVENOUS at 07:56

## 2024-02-20 RX ADMIN — FENTANYL CITRATE 50 MCG: 50 INJECTION, SOLUTION INTRAMUSCULAR; INTRAVENOUS at 09:26

## 2024-02-20 RX ADMIN — GABAPENTIN 300 MG: 300 CAPSULE ORAL at 07:34

## 2024-02-20 RX ADMIN — PHENYLEPHRINE HYDROCHLORIDE 100 MCG: 10 INJECTION INTRAVENOUS at 08:07

## 2024-02-20 RX ADMIN — CLINDAMYCIN PHOSPHATE 900 MG: 150 INJECTION, SOLUTION INTRAMUSCULAR; INTRAVENOUS at 07:56

## 2024-02-20 RX ADMIN — FENTANYL CITRATE 50 MCG: 50 INJECTION, SOLUTION INTRAMUSCULAR; INTRAVENOUS at 07:46

## 2024-02-20 RX ADMIN — BUPIVACAINE HYDROCHLORIDE AND EPINEPHRINE 15 ML: 5; 5 INJECTION, SOLUTION EPIDURAL; INTRACAUDAL; PERINEURAL at 07:35

## 2024-02-20 RX ADMIN — PHENYLEPHRINE HYDROCHLORIDE 200 MCG: 10 INJECTION INTRAVENOUS at 08:31

## 2024-02-20 RX ADMIN — SODIUM CHLORIDE, POTASSIUM CHLORIDE, SODIUM LACTATE AND CALCIUM CHLORIDE: 600; 310; 30; 20 INJECTION, SOLUTION INTRAVENOUS at 07:34

## 2024-02-20 RX ADMIN — SUGAMMADEX 200 MG: 100 INJECTION, SOLUTION INTRAVENOUS at 09:27

## 2024-02-20 RX ADMIN — TRANEXAMIC ACID 1000 MG: 100 INJECTION, SOLUTION INTRAVENOUS at 07:56

## 2024-02-20 RX ADMIN — OXYCODONE HYDROCHLORIDE 5 MG: 5 SOLUTION ORAL at 09:51

## 2024-02-20 RX ADMIN — PHENYLEPHRINE HYDROCHLORIDE 200 MCG: 10 INJECTION INTRAVENOUS at 08:26

## 2024-02-20 RX ADMIN — ACETAMINOPHEN 1000 MG: 500 TABLET ORAL at 07:34

## 2024-02-20 RX ADMIN — PHENYLEPHRINE HYDROCHLORIDE 100 MCG: 10 INJECTION INTRAVENOUS at 08:14

## 2024-02-20 RX ADMIN — ONDANSETRON 4 MG: 2 INJECTION INTRAMUSCULAR; INTRAVENOUS at 09:27

## 2024-02-20 RX ADMIN — ROCURONIUM BROMIDE 70 MG: 50 INJECTION, SOLUTION INTRAVENOUS at 07:55

## 2024-02-20 RX ADMIN — DEXAMETHASONE SODIUM PHOSPHATE 4 MG: 4 INJECTION INTRA-ARTICULAR; INTRALESIONAL; INTRAMUSCULAR; INTRAVENOUS; SOFT TISSUE at 07:58

## 2024-02-20 RX ADMIN — PROPOFOL 150 MG: 10 INJECTION, EMULSION INTRAVENOUS at 07:54

## 2024-02-20 RX ADMIN — FENTANYL CITRATE 25 MCG: 50 INJECTION, SOLUTION INTRAMUSCULAR; INTRAVENOUS at 09:51

## 2024-02-20 RX ADMIN — LIDOCAINE HYDROCHLORIDE 100 MG: 20 INJECTION, SOLUTION EPIDURAL; INFILTRATION; INTRACAUDAL at 07:54

## 2024-02-20 RX ADMIN — PHENYLEPHRINE HYDROCHLORIDE 200 MCG: 10 INJECTION INTRAVENOUS at 08:22

## 2024-02-20 ASSESSMENT — PAIN DESCRIPTION - PAIN TYPE
TYPE: SURGICAL PAIN

## 2024-02-20 ASSESSMENT — FIBROSIS 4 INDEX: FIB4 SCORE: 0.77

## 2024-02-20 NOTE — OP REPORT
DATE OF SERVICE: February 20, 2024     PREOPERATIVE DIAGNOSES:  1. Right shoulder rotator cuff tear.  2. Right shoulder biceps partial tear.  3. Right shoulder subacromial impingement.  4. Right shoulder labral tearing.      POSTOPERATIVE DIAGNOSES:  1. Right shoulder rotator cuff tear of the supraspinatus, infraspinatus, and subscapularis.  2. Right shoulder biceps partial tear.  3. Right shoulder subacromial impingement   4. Right shoulder tearing of the anterior and superior labrum, tearing of the biceps labral anchor, and subacromial bursitis.     PROCEDURE PERFORMED:  1.  Right shoulder diagnostic arthroscopy.  2.  Right shoulder arthroscopic rotator cuff repair of the supraspinatus, infraspinatus, and subscapularis.  3.  Right shoulder open subpectoral biceps tenodesis  4.  Right shoulder subacromial decompression.  5.  Right shoulder extensive debridement of the anterior and superior labrum, debridement of the biceps labral anchor, and subacromial bursectomy.     ATTENDING SURGEON:  Ramila Marroquin MD     ASSISTANT: Juan C Jerez PA-C     ANESTHESIA:  General with an interscalene nerve block.     ANESTHESIOLOGIST: Eric Rai DO     SPECIMENS:  None.     ESTIMATED BLOOD LOSS:  10 mL     FINDINGS:  There was a large rotator cuff tear of the supraspinatus, infraspinatus, and subscapularis.  There was a partial tear of the long head of the biceps tendon.  There is tearing of the anterior and superior labrum.  There was tearing of the biceps labral anchor.  There was extensive subacromial impingement and bursitis.  The cartilage of the glenohumeral joint was intact.     COMPLICATIONS:  None.     IMPLANTS:    Witten iconix 2 anchor x 1 for biceps tenodesis  Ryan 4.75 mm triple loaded alpha vent anchor x 2 for supraspinatus and subscapularis repair.  Witten 4.75 mm double loaded alpha vent anchor x 1 for infraspinatus repair.     INDICATIONS:  The patient is a very nice 58-year-old male who presented to  clinic with worsening right shoulder pain and weakness. Given the patient's continued pain and dysfunction and failure of non operative management, the patient was indicated for surgery.  I had a long discussion with the patient regarding the risks and benefits of surgery including but not limited to bleeding, infection, risk to surrounding structures such as blood vessels and nerves, pain, scar, reoperation, hardware failure, risk with anesthesia such as stroke, heart attack, deep venous thrombosis, pulmonary embolism and death.  The patient understood the risks and benefits and elected to proceed with surgery.     PROCEDURE IN DETAIL:  The patient was met in the preoperative hold area where the correct right upper extremity was marked with my initials. The patient then underwent an interscalene nerve block under ultrasound guidance by the anesthesia team. The patient was transferred to the operating room where he was placed supine on the operating room table with all bony prominences well padded.  The patient received 2 g of preoperative Ancef and 900 mg clindamycin. The patient was intubated by the anesthesia team without complications. The patient was then placed in the beach chair position again with all bony prominences well padded.  Preoperative exam under anesthesia revealed full range of motion of the right shoulder with forward flexion to 180 degrees, abduction to 180 degrees, external rotation to 45 degrees.  The patient's right upper extremity was then prepped and draped in the usual sterile fashion.  A preoperative timeout was held where all those in the room agreed upon the correct patient, the correct site of surgery and the correct surgery to be performed.     I began the procedure by injecting 30 mL of 0.25% Marcaine with epinephrine into the portal sites and into the axillary incision for the biceps tenodesis.  I then used an #11 blade to make my posterior portal and inserted the scope into the  glenohumeral joint.  I then made my anterior portal under direct visualization using a spinal needle.   There was a partial tear of the long head of the biceps tendon with flattening of the tendon.  There was tearing of the biceps labral anchor.  I used the arthroscopic biter to perform my biceps tenotomy.  I used the 4.0 mm sucker shaver to debride the biceps tendon stump. There was tearing of the labrum anteriorly and superiorly as well as extensive glenohumeral synovitis.  I then used the 4.0mm sucker shaver to perform an extensive labral debridement and to debride the glenohumeral synovitis.  There were no loose bodies in the axillary recess.  There was a partial tear of the subscapularis with retraction of the tendon to the level of the glenoid.  With the arm slightly externally rotated, I examined the rest of the rotator cuff which showed a full tear of the supraspinatus and anterior edge of the infraspinatus.  The cartilage of the glenohumeral joint was intact.  The scope was then removed from the shoulder and I turned my attention towards the subpectoral biceps tenodesis.     I made a 3 cm incision in the axillary fold for my subpectoral biceps tenodesis.  I dissected down through skin and subcutaneous tissues using Bovie electrocautery for hemostasis.  I identified my inferior border of the pectoralis and this was retracted superiorly.  Hohmann retractor was placed laterally on the lateral aspect of the humerus and I identified my bicipital groove.  A right angle snap was used to retrieve my biceps tendon within the bicipital groove and the synovium was cleared off for my tenodesis.  I then used a Ryan iconix 2 suture anchor unicortically within the bicipital groove and then used a free needle to pass the sutures through my biceps tendon for tenodesis.  The proximal segment of the tendon was removed and passed off and the tendon was tied down to the anchor without complications.   Next, I copiously  irrigated out the subpectoral incision with normal saline and performed a layered closure using 3-0 Monocryl followed by a running 3-0 Monocryl.       The scope was then inserted back into the glenohumeral joint and I established my anterior lateral portal under direct visualization with a spinal needle.  I then used a combination of the ablator and 4.0 mm sucker shaver to debride the lesser tuberosity to get down to nice good bleeding bone.  I then placed one 4.75 mm triple loaded alpha vent suture anchor into the lesser tuberosity and had excellent bite.  The sutures were then passed in a simple fashion and tied and this reduced the subscapularis nicely back down to the lesser tuberosity.     The scope was then inserted into the subacromial space and I established my lateral portal under direct visualization with a spinal needle.  There was thickened and inflamed subacromial bursa, so the 4.0mm sucker shaver and SERFAS ablater were used to perform a subacromial bursectomy.  There was a large anterior osteophyte on the undersurface of the acromion causing impingement.  I then performed a subacromial decompression using the 5.5 mm resector and SERFAS ablater turning his type II acromion into a type I acromion.  Visualizing the rotator cuff from the bursal side again showed a large, full thickness tear.  I then used the 4.0 mm sucker shaver to bur the bone at the greater tuberosity to allow for bleeding after I placed my anchors.  I then placed two Lyle 4.75 mm triple loaded and double loaded  suture anchors percutaneously on the greater tuberosity.  The anchors were inserted under direct visualization, so they did not penetrate the humeral head.  I then used a suture passer to pass my sutures from anterior to posterior and these were tied in a simple fashion.  This reduced the tendon back down to the greater tuberosity well with no significant tension on the tendon.  I then used a small microfracture punch to  create healing holes just lateral to my repair to aid with rotator cuff healing.  I then copiously irrigated out the shoulder with arthroscopic fluid and the scope was removed from the shoulder.    The wounds were copiously irrigated and a 3-0 Prolene was used to suture the portals and a sterile dressing was applied.  The patient's operative extremity was placed in a shoulder abduction sling and awoken from anesthesia without complications.  Please note that all counts were correct at the end of the case and Juan C Jerez PA-C, was necessary and critical for all portions of the procedure including retraction, suture management, visualization, and positioning and without their help, the surgery would not have been as technically successful. The patient was provided with a shoulder immobilizer at the time of service. Any delay would put patient at further risk of injury.        Ramila Marroquin MD

## 2024-02-20 NOTE — ANESTHESIA POSTPROCEDURE EVALUATION
Patient: Armen Santiago    Procedure Summary       Date: 02/20/24 Room / Location:  OR  / SURGERY [facility]    Anesthesia Start: 0746 Anesthesia Stop: 0940    Procedures:       Right Shoulder arthroscopy with rotator cuff repair (Right: Shoulder)      TENODESIS, BICEPS (Right: Shoulder)      DECOMPRESSION, SUBACROMIAL SPACE (Right: Shoulder)      ARTHROSCOPY, SHOULDER, WITH EXTENSIVE DEBRIDEMENT (Right: Shoulder) Diagnosis:       Traumatic tear of right rotator cuff, initial encounter      Biceps tendonitis on right      Glenoid labral tear, right, initial encounter      Subacromial impingement of right shoulder      (Traumatic tear of right rotator cuff, initial encounter [S46.011A])    Surgeons: Ramila Marroquin M.D. Responsible Provider: Eric Rai D.O.    Anesthesia Type: general ASA Status: 3            Final Anesthesia Type: general  Last vitals  BP   Blood Pressure: (!) 147/81    Temp   36 °C (96.8 °F)    Pulse   67   Resp   16    SpO2   95 %      Anesthesia Post Evaluation    Patient location during evaluation: PACU  Patient participation: complete - patient participated  Level of consciousness: awake and alert    Airway patency: patent  Anesthetic complications: no  Cardiovascular status: hemodynamically stable  Respiratory status: acceptable  Hydration status: euvolemic    PONV: none          No notable events documented.     Nurse Pain Score: 4 (NPRS)

## 2024-02-20 NOTE — DISCHARGE INSTR - OTHER INFO
Discharge instructions:    General Instructions    You will be in a sling at all times for 6 weeks.  You may remove the sling to shower, do your home exercises, and when you are in physical therapy.    You will sleep in your sling - recliners or propping up with pillows works best    No active motion of the arm    Elevate the operative extremity when you are resting to help minimize the swelling.    Use ice to help control the swelling and pain.  DO NOT USE HEAT - this will increase the swelling.    Call the office if you develop fevers (over 100.5) or chills.    You should have an office appointment about 7-10 following your discharge from the hospital.  If you do not please call [phone].      Wound Care    You may shower 2 days after surgery if the wound is dry. Keep water exposure to the incision site brief and blot it dry when you get out.  Do not bathe or swim or Jacuzzi (ie. Do not submerge the incision) for approximately 3 to 4 weeks.    Do not use ointments or creams on the incision.      Keep the incision clean and dry.  Any drainage from the incision should be reported to the doctor immediately.      You may notice some bruising around the incision and into the operative extremity.  This is not uncommon and should begin to go away within the first 2 weeks after surgery.    At the time of surgery tape-like strips (Steri-strips) may be placed on your incision to protect it.  These will eventually come off on their own in one to two weeks, or you may remove them yourself after two weeks.    Activity    Estimated return to work varies depending on the demands of your job.  Some ambitious patients return to desk jobs / administrative type work as early as 1 week after surgery (but usually more like 1 month).  For active labor or heavy labor, it may take 3 to 6 months to return to work.     You should do the exercises given to you at discharge until you return for your post-op visit. At that time, you may  be given a new set of exercises.    You cannot drive while in the sling (for the first 6 weeks)      Medications    You were prescribed a short acting, narcotic pain medication.  It is recommended that you begin to wean off of this medication in about 3 days after surgery.  To help wean off of the pain medications or to supplement your pain control you can use Tylenol to help with pain.    You were prescribed an anti-inflammatory medication which you will take for 5 days after surgery.  Take with food if GI discomfort occurs.      You were prescribed an anti-nausea medication that you may take as needed.    You will not be discharged from the hospital with any antibiotics unless there are specific concerns regarding infection.

## 2024-02-20 NOTE — ANESTHESIA TIME REPORT
Anesthesia Start and Stop Event Times       Date Time Event    2/20/2024 0740 Ready for Procedure     0746 Anesthesia Start     0940 Anesthesia Stop          Responsible Staff  02/20/24      Name Role Begin End    Eric Rai D.O. Anesth 0746 0940          Overtime Reason:  no overtime (within assigned shift)    Comments:

## 2024-02-20 NOTE — ANESTHESIA PROCEDURE NOTES
Airway    Date/Time: 2/20/2024 7:56 AM    Performed by: Eric Rai D.O.  Authorized by: Eric Rai D.O.    Location:  OR  Urgency:  Elective  Difficult Airway: No    Indications for Airway Management:  Anesthesia      Spontaneous Ventilation: absent    Sedation Level:  Deep  Preoxygenated: Yes    Patient Position:  Sniffing  Final Airway Type:  Endotracheal airway  Final Endotracheal Airway:  ETT  Cuffed: Yes    Technique Used for Successful ETT Placement:  Direct laryngoscopy    Insertion Site:  Oral  Blade Type:  Laguna  Laryngoscope Blade/Videolaryngoscope Blade Size:  3  ETT Size (mm):  7.0  Measured from:  Teeth  ETT to Teeth (cm):  21  Placement Verified by: auscultation and capnometry    Cormack-Lehane Classification:  Grade I - full view of glottis  Number of Attempts at Approach:  1

## 2024-02-20 NOTE — LETTER
January 17, 2024    Patient Name: Armen Santiago  Surgeon Name: Ramila Marroquin M.D.  Surgery Facility: [facility] ([address] [place])  Surgery Date: 2/20/2024    The time of your surgery is not final and may change up to and until the day of your surgery. You will be contacted 24-48 hours prior to your surgery date with your check-in and surgery time.    If you will not be at one of the below numbers please call the surgery scheduler at [phone]  Preferred Phone: [phone]    BEFORE YOUR SURGERY   Pre Registration and/or Lab Work must be done within and no earlier than 28 days prior to your surgery date. Your scheduled facility will contact you regarding all required preregistration and/or lab work. If you have not been contacted within 7 days of your scheduled procedure please call [facility] at [phone] for an appointment as soon as possible.    DAY OF YOUR SURGERY  Nothing to eat or drink after midnight     Refrain from smoking any substance after midnight prior to surgery. Smoking may interfere with the anesthetic and frequently produces nausea during the recovery period.    Continue taking all lifesaving medications. Including the morning of your surgery with small sip of water.    Please do NOT take on the day of surgery:  Diuretics: examples- furosemide (Lasix), spironolactone, hydrochlorothiazide  ACE-inhibitors: examples- lisinopril, ramipril, enalapril  “ARBs”: examples- losartan, Olmesartan, valsartan    Please arrive at the hospital/surgery center at the check-in time provided.     An adult will need to bring you and take you home after your surgery.     AFTER YOUR SURGERY  Post op Appointment:   Date: 3.4.24   Time: 10:00 AM   With: Ramila Marroquin MD   Location: [address] [place], [place] [address] [address]    - Therapy- Your first appointment should be 3  day(s) after your surgery. For your convenience we have 4 Physical  Therapy locations: [facility], [place], and [facility]. Call our office ASAP to schedule an appointment at [phone] or take the enclosed Therapy Prescription to a facility of your choice.  - Post Surgery - You will need someone to drive you home  - Post Surgery - You will need someone to stay with you for 24 hours     TIME OFF WORK  FMLA or Disability forms can be faxed directly to: [phone] or you may drop them off at [address] [place] [address]. Our office charges a $35.00 fee per form. Forms will be completed within 10 business days of drop off and payment received. For the status of your forms you may contact our disability office directly [phone].    MEDICATION INSTRUCTIONS **Please read section completely**  The following medications should be stopped a minimum of 10 days prior to surgery:  All over the counter, Supplements & Herbal medications    Anorectics: Phentermine (Adipex-P, Lomaira and Suprenza), Phentermine-topiramate (Qsymia), Bupropion-naltrexone (Contrave)    **If you are on Bupropion for anxiety/depression, please continue this medication up until the day of surgery.     Opiod Partial Agonists/Opioid Antagonists: Buprenorphine (Suboxone, Belbuca, Butrans, Probuphine Implant, Sublocade), Naltrexone (ReVia, Vivitrol), Naloxone    Amphetamines: Dextroamphetamine/Amphetamine (Adderall, Mydayis), Methylphenidate Hydrochloride (Concerta, Metadate, Methylin, Ritalin)    The following medications should be stopped 5 days prior to surgery:  Blood Thinners: Any Aspirin, Aspirin products, anti-inflammatories such as ibuprofen and any blood thinners such as Coumadin and Plavix. Please consult your prescribing physician if you are on life saving blood thinners, in regards to when to stop medications prior to surgery.     The following medications should be stopped a minimum of 3 days prior to surgery:  PDE-5 inhibitors: Sildenafil (Viagra), Tadalafil (Cialis), Vardenafil  (Levitra), Avanafil (Stendra)    MAO Inhibitors: Rasagiline (Azilect), Selegiline (Eldepryl, Emsam, Selapar), Isocarboxazid (Marplan), Phenelzine (Nardil)

## 2024-02-20 NOTE — OR NURSING
1046 Pt arrival to stage II dressed and transferred to recliner. Pt denies pain and nausea.   1115 Pt and family educated on discharge instructions.   1130 IV removed pt D/C home to care of family via wheelchair.

## 2024-02-20 NOTE — H&P
Surgery Orthopedic History & Physical Note    Date  2/20/2024    Primary Care Physician  Umesh Allen M.D.      Pre-Op Diagnosis Codes:     * Traumatic tear of right rotator cuff, initial encounter [S46.011A]     * Biceps tendonitis on right [M75.21]     * Glenoid labral tear, right, initial encounter [S43.431A]     * Subacromial impingement of right shoulder [M75.41]    HPI  This is a 58 y.o. male who presented with shoulder pain.  He reports that approximately 4 months ago he was fixing his sink he was pulling himself out from under the cabinet and felt a loud pop on the lateral aspect of his left shoulder.  He states that he has had ongoing pain and weakness since this incident.  He has been very diligent with conservative care of these past several weeks.  He has engaged in a physician directed home exercise program, anti-inflammatory medications, topical creams, as well as icing and still is having persistent weakness and pain.  Of note, he does have a history of a left shoulder rotator cuff repair that was done in 2006 which she did pretty well without until this most recent incident.  In regard to his right shoulder, he endorses ongoing symptoms.  He points to the lateral aspect of his right shoulder as the most severe area of pain.  He states he has had ongoing weakness on this right shoulder for the last several months.  He states this is affecting his job.  He has been quite diligent with conservative care on this right shoulder as well with minimal relief.  He has failed a physician directed home exercise program anti-inflammatory medications, rest, ice, and activity modifications.  He recently underwent an MRI of both of his shoulders and is here today for further evaluation.     Past Medical History:   Diagnosis Date    Arthritis     CAD (coronary artery disease)     Heart attack (HCC) 2018    stent    High cholesterol     Hypertension     Pain     shoulders       Past Surgical History:    Procedure Laterality Date    ARTHROPLASTY Right     unicomparmental    OTHER CARDIAC SURGERY      stent x 1    ROTATOR CUFF REPAIR Left     TONSILLECTOMY      UMBILICAL HERNIA REPAIR         Current Facility-Administered Medications   Medication Dose Route Frequency Provider Last Rate Last Admin    ceFAZolin (Ancef) injection 2 g  2 g Intravenous Once Juan C Jreez P.A.-C.        gabapentin (Neurontin) capsule 300 mg  300 mg Oral Once Eric Rai D.O.        acetaminophen (Tylenol) tablet 1,000 mg  1,000 mg Oral Once CHRISTY Leger.OAntonio        lactated ringers infusion   Intravenous Continuous Ramila Marroquin M.D.           Social History     Socioeconomic History    Marital status:      Spouse name: Not on file    Number of children: Not on file    Years of education: Not on file    Highest education level: Not on file   Occupational History    Not on file   Tobacco Use    Smoking status: Every Day     Current packs/day: 0.50     Average packs/day: 0.5 packs/day for 34.1 years (17.1 ttl pk-yrs)     Types: Cigarettes     Start date: 1/1/1990    Smokeless tobacco: Never   Vaping Use    Vaping Use: Some days    Substances: Flavoring   Substance and Sexual Activity    Alcohol use: Yes     Alcohol/week: 8.4 - 12.6 oz     Types: 14 - 21 Cans of beer per week     Comment: socially    Drug use: Not Currently     Types: Marijuana     Comment: THC/CBD cream    Sexual activity: Yes     Partners: Female     Comment: wife   Other Topics Concern    Not on file   Social History Narrative    Not on file     Social Determinants of Health     Financial Resource Strain: Not on file   Food Insecurity: Not on file   Transportation Needs: Not on file   Physical Activity: Not on file   Stress: Not on file   Social Connections: Not on file   Intimate Partner Violence: Not on file   Housing Stability: Not on file       Family History   Problem Relation Age of Onset    Heart Failure Mother     Dementia Father      Parkinson's Disease Paternal Grandmother        Allergies  Patient has no known allergies.    Review of Systems  Negative except for right shoulder pain    Physical Exam    Vital Signs  Blood Pressure: 117/73   Temperature: 36.1 °C (97 °F)   Pulse: 79   Respiration: 20   Pulse Oximetry: 94 %       GENERAL: A+Ox3. INAD. Well appearing and well groomed.  MENTAL STATUS: Pleasant and cooperative. Alert and oriented x3 with normal mood and affect.  Vascular: Pulses are palpable 2+, capillary refills to digits are normal.  Skin: No wounds/lesions/ulcers. Skin warm & dry.  Respiratory: No respiratory distress      Assessment/Plan:  Right UPPER EXTREMITY:   Normal posture.  Well-healed incisions from previous surgery.  Shoulder: No swelling.  Range of Motion: Forward Flexion 160, Abduction 160, ER 45, IR L1.  He has significant pain at the extremes of motion.  Strength: 4/5 Supraspinatus, 4/5 External Rotators, 5/5 Subscapularis  Biceps: Tender to palpation.  Positive Speed.  Positive Guthrie's.  Tenderness: Tender over the anterior lateral aspect of the shoulder.  Impingement Signs: Positive Shelby.  Positive Neer's.  Positive empty can.  Negative belly press.  Stability: Normal.  Sensation intact to light touch in the bilateral upper extremities.  2+ radial pulses.     Imaging:   Multiple radiographic views of the patient's right shoulder conducted at [facility] on 2/6/2023 reviewed by myself today.  These images demonstrate no acute osseous abnormality.     MRI of the right shoulder from November 6, 2023 was reviewed by me today.  The MRI shows a full tear of the supraspinatus that propagates into the infraspinatus with about 2 cm of retraction which is mild muscle atrophy.  There is also an essentially full tear of the subscapularis as well as medial dislocation of the long head of the biceps tendon out of the bicipital groove.  There is mild to moderate glenohumeral and acromioclavicular  chondromalacia.  ASSESSMENT & PLAN: Celio is a 58-year-old male with bilateral shoulder pain with imaging and exam consistent with a diagnosis of a right shoulder acute rotator cuff tear and medial dislocation of the long head of the biceps tendon and a left shoulder chronic rotator cuff tear after a previous repair with resulting rotator cuff arthropathy.     -I discussed these findings at length with Celio and his wife today.   -For the right shoulder, the rotator cuff tear looks more acute, so I would recommend surgery for this which would be a right shoulder scope with rotator cuff repair with possible REGENETEN patch augmentation, biceps tenodesis, and repairs as indicated.  -Patient was educated on clinical and imaging findings.  -Patient verbalizes understanding of all discussions today, and all questions were answered satisfactorily.        It was explained to the patient that any surgical procedure carries with it the risks of loss of limb or loss of life. Medical complications include but are not limited to death or disability from a heart attack, stroke, GI bleeding, thrombophlebitis and pulmonary embolism, sepsis, adverse reactions (death) due to blood transfusions, allergy or adverse drug reaction. There are other rare, unknown and uncommon systemic conditions that could also adversely affect the systemic outcome. Local complications include but are not limited to wound dehiscence, deep infection, failure of fixation or reconstruction, damage to nerves and vessels which could be temporary or permanent, local recurrence as well as other rare, uncommon and unknown local complications that may necessitate re-operation, more complex orthopaedic reconstructions or amputation. The Patient was informed, questions were answered, and the consents were signed.  They understood the procedure and despite the risks decided to proceed with surgery.           Juan C Jerez PA-C

## 2024-02-20 NOTE — OR NURSING
0937: Patient arrived to PACU from OR via [facility]. Report received from anesthesia and RN. Respirations are spontaneous and unlabored. VSS on 6L. Dressing is clean, dry and intact. Cold pack applied. RUE elevated. RUE; radial pulse is 2+, cap refill less than 3 seconds, warm pink. OPA in place.     0948: OPA removed. Respirations even and unlabored.    0951: Patient reports 4/10 pain, see MAR.    1000: Wife updated via phone.   Pt using IS =2000, goal 2550.    1015: Patient reports pain is tolerable, declines further medication.     1025: [facility] called per wife's request. Rxs will be ready to be picked up shortly. Wife called, VM left.     1045: Recovery completed at this time. Patient meets criteria for transfer to stage 2. Report given to RN.    1046: CNA at bedside to transfer patient to stage 2.

## 2024-02-20 NOTE — DISCHARGE INSTRUCTIONS
What to Expect Post Anesthesia    Rest and take it easy for the first 24 hours.  A responsible adult is recommended to remain with you during that time.  It is normal to feel sleepy.  We encourage you to not do anything that requires balance, judgment or coordination.    FOR 24 HOURS DO NOT:  Drive, operate machinery or run household appliances.  Drink beer or alcoholic beverages.  Make important decisions or sign legal documents.    To avoid nausea, slowly advance diet as tolerated, avoiding spicy or greasy foods for the first day.  Add more substantial food to your diet according to your provider's instructions.  Babies can be fed formula or breast milk as soon as they are hungry.  INCREASE FLUIDS AND FIBER TO AVOID CONSTIPATION.    MILD FLU-LIKE SYMPTOMS ARE NORMAL.  YOU MAY EXPERIENCE GENERALIZED MUSCLE ACHES, THROAT IRRITATION, HEADACHE AND/OR SOME NAUSEA.    If any questions arise, call your provider.  If your provider is not available, please feel free to call the Surgical Center at [phone].    MEDICATIONS: Resume taking daily medication.  Take prescribed pain medication with food.  If no medication is prescribed, you may take non-aspirin pain medication if needed.  PAIN MEDICATION CAN BE VERY CONSTIPATING.  Take a stool softener or laxative such as senokot, pericolace, or milk of magnesia if needed.    Last pain medication given at: 5 mg oxycodone at 9:51 am    PLEASE REFER TO DR. WOLF'S DISCHARGE FOLDER FOR FURTHER INSTRUCTIONS

## 2024-02-20 NOTE — ANESTHESIA PROCEDURE NOTES
Peripheral Block    Date/Time: 2/20/2024 7:35 AM    Performed by: Eric Rai D.O.  Authorized by: Eric Rai D.O.    Patient Location:  Pre-op  Start Time:  2/20/2024 7:35 AM  End Time:  2/20/2024 7:40 AM  Reason for Block: at surgeon's request and post-op pain management ONLY    patient identified, IV checked, site marked, risks and benefits discussed, surgical consent, monitors and equipment checked, pre-op evaluation and timeout performed    Patient Position:  Supine  Prep: ChloraPrep    Monitoring:  Heart rate, continuous pulse ox and cardiac monitor  Block Region:  Upper Extremity  Upper Extremity - Block Type:  BRACHIAL PLEXUS block, Supraclavicular approach    Laterality:  Right  Procedures: ultrasound guided  Image captured, interpreted and electronically stored.  Local Infiltration:  Lidocaine  Strength:  1 %  Dose:  3 ml  Block Type:  Single-shot  Needle Length:  100mm  Needle Gauge:  21 G  Needle Localization:  Ultrasound guidance  Ultrasound picture in chart  Injection Assessment:  Negative aspiration for heme, no paresthesia on injection, incremental injection and local visualized surrounding nerve on ultrasound   US Guided Supraclavicular Brachial Plexus Block    US transducer placed cephalad and parallel to clavicle in angle to view the subclavian artery with the brachial plexus lateral and superficial to the artery. Needle inserted lateral to the transducer in-plane and advanced with the needle tip visualized continually into the perineural position. After negative aspiration, LA injected without resistance.

## 2024-02-20 NOTE — ANESTHESIA PREPROCEDURE EVALUATION
Case: [id number] Anesthesia Start Date/Time: 02/20/24 0746    Procedure: Right Shoulder arthroscopy with rotator cuff repair with possible patch augmentation, biceps tenodesis, subacromial decrompression, labral debridment, repairs as indicated    Anesthesia type: general    Diagnosis:       Traumatic tear of right rotator cuff, initial encounter [S46.011A]      Biceps tendonitis on right [M75.21]      Glenoid labral tear, right, initial encounter [S43.431A]      Subacromial impingement of right shoulder [M75.41]    Pre-op diagnosis: Traumatic tear of right rotator cuff, initial encounter [S46.011A]    Location:  OR 03 / SURGERY [facility]    Surgeons: Ramila Marroquin M.D.            Relevant Problems   CARDIAC   (positive) Coronary artery disease involving native coronary artery of native heart without angina pectoris       Physical Exam    Airway   Mallampati: II  TM distance: >3 FB  Neck ROM: full       Cardiovascular - normal exam  Rhythm: regular  Rate: normal  (-) murmur     Dental - normal exam           Pulmonary - normal exam  Breath sounds clear to auscultation     Abdominal    Neurological - normal exam                   Anesthesia Plan    ASA 3   ASA physical status 3 criteria: CAD/stents (> 3 months)    Plan - general and peripheral nerve block     Peripheral nerve block will be post-op pain control  Airway plan will be ETT          Induction: intravenous    Postoperative Plan: Postoperative administration of opioids is intended.    Pertinent diagnostic labs and testing reviewed    Informed Consent:    Anesthetic plan and risks discussed with patient.    Use of blood products discussed with: patient whom consented to blood products.

## 2024-03-08 ENCOUNTER — OFFICE VISIT (OUTPATIENT)
Dept: CARDIOLOGY | Facility: MEDICAL CENTER | Age: 59
End: 2024-03-08
Attending: FAMILY MEDICINE
Payer: COMMERCIAL

## 2024-03-08 VITALS
BODY MASS INDEX: 26.16 KG/M2 | WEIGHT: 157 LBS | HEART RATE: 81 BPM | SYSTOLIC BLOOD PRESSURE: 118 MMHG | RESPIRATION RATE: 16 BRPM | HEIGHT: 65 IN | OXYGEN SATURATION: 94 % | DIASTOLIC BLOOD PRESSURE: 80 MMHG

## 2024-03-08 DIAGNOSIS — I25.10 CORONARY ARTERY DISEASE INVOLVING NATIVE CORONARY ARTERY OF NATIVE HEART WITHOUT ANGINA PECTORIS: ICD-10-CM

## 2024-03-08 DIAGNOSIS — F17.210 TOBACCO DEPENDENCE DUE TO CIGARETTES: ICD-10-CM

## 2024-03-08 DIAGNOSIS — Z95.5 S/P PRIMARY ANGIOPLASTY WITH CORONARY STENT: ICD-10-CM

## 2024-03-08 DIAGNOSIS — E78.00 HYPERCHOLESTEROLEMIA WITH LDL GREATER THAN 190 MG/DL: ICD-10-CM

## 2024-03-08 DIAGNOSIS — Z78.9 STATIN INTOLERANCE: ICD-10-CM

## 2024-03-08 DIAGNOSIS — I10 ESSENTIAL HYPERTENSION, BENIGN: ICD-10-CM

## 2024-03-08 DIAGNOSIS — E78.2 MIXED HYPERLIPIDEMIA: ICD-10-CM

## 2024-03-08 LAB — EKG IMPRESSION: NORMAL

## 2024-03-08 PROCEDURE — 3074F SYST BP LT 130 MM HG: CPT | Performed by: INTERNAL MEDICINE

## 2024-03-08 PROCEDURE — 93010 ELECTROCARDIOGRAM REPORT: CPT | Performed by: INTERNAL MEDICINE

## 2024-03-08 PROCEDURE — 3079F DIAST BP 80-89 MM HG: CPT | Performed by: INTERNAL MEDICINE

## 2024-03-08 PROCEDURE — 99406 BEHAV CHNG SMOKING 3-10 MIN: CPT | Performed by: INTERNAL MEDICINE

## 2024-03-08 PROCEDURE — 99212 OFFICE O/P EST SF 10 MIN: CPT | Performed by: INTERNAL MEDICINE

## 2024-03-08 PROCEDURE — 99205 OFFICE O/P NEW HI 60 MIN: CPT | Mod: 25 | Performed by: INTERNAL MEDICINE

## 2024-03-08 PROCEDURE — 93005 ELECTROCARDIOGRAM TRACING: CPT | Performed by: INTERNAL MEDICINE

## 2024-03-08 RX ORDER — LISINOPRIL 2.5 MG/1
2.5 TABLET ORAL DAILY
Qty: 30 TABLET | Refills: 3 | Status: SHIPPED | OUTPATIENT
Start: 2024-03-08

## 2024-03-08 RX ORDER — ROSUVASTATIN CALCIUM 5 MG/1
5 TABLET, COATED ORAL
Qty: 30 TABLET | Refills: 3 | Status: SHIPPED | OUTPATIENT
Start: 2024-03-08

## 2024-03-08 RX ORDER — EVOLOCUMAB 140 MG/ML
140 INJECTION, SOLUTION SUBCUTANEOUS
Qty: 2 ML | Refills: 6 | Status: SHIPPED | OUTPATIENT
Start: 2024-03-08

## 2024-03-08 ASSESSMENT — ENCOUNTER SYMPTOMS
DEPRESSION: 0
DIZZINESS: 0
PND: 0
PALPITATIONS: 0
VOMITING: 0
FEVER: 0
FLANK PAIN: 0
ABDOMINAL PAIN: 0
CLAUDICATION: 0
ORTHOPNEA: 0
WEIGHT LOSS: 0
ALTERED MENTAL STATUS: 0
DYSPNEA ON EXERTION: 0
IRREGULAR HEARTBEAT: 0
DIARRHEA: 0
WEIGHT GAIN: 0
SHORTNESS OF BREATH: 0
BLURRED VISION: 0
CONSTIPATION: 0
NEAR-SYNCOPE: 0
NAUSEA: 0
HEARTBURN: 0
DECREASED APPETITE: 0
SYNCOPE: 0
COUGH: 0
BACK PAIN: 0

## 2024-03-08 ASSESSMENT — FIBROSIS 4 INDEX: FIB4 SCORE: 0.77

## 2024-03-08 NOTE — PROGRESS NOTES
Cardiology Note    Chief Complaint   Patient presents with    Hyperlipidemia    Coronary Artery Disease    Establish Care       History of Present Illness: Armen Santiago is a 58 y.o. male PMH CAD / 2019 MI s/p PCI, HLD (LDL >190), HTN who presents for initial visit.     Doing well this visit. Denies cardiac complaints. Previously didn't tolerate high dose atorvastatin due to symptoms of dementia his wife states. Had issues getting pcsk9i and referred as a result. Compliant with current medications and denies adverse effects. Continues to smoke tobacco. Failed to quit previously on chantix. He would consider wellbutrin however wife states he has had symptoms suggestive christian in past.     Review of Systems   Constitutional: Negative for decreased appetite, fever, malaise/fatigue, weight gain and weight loss.   HENT:  Negative for congestion and nosebleeds.    Eyes:  Negative for blurred vision.   Cardiovascular:  Negative for chest pain, claudication, dyspnea on exertion, irregular heartbeat, leg swelling, near-syncope, orthopnea, palpitations, paroxysmal nocturnal dyspnea and syncope.   Respiratory:  Negative for cough and shortness of breath.    Endocrine: Negative for cold intolerance and heat intolerance.   Skin:  Negative for rash.   Musculoskeletal:  Negative for back pain.   Gastrointestinal:  Negative for abdominal pain, constipation, diarrhea, heartburn, melena, nausea and vomiting.   Genitourinary:  Negative for dysuria, flank pain and hematuria.   Neurological:  Negative for dizziness.   Psychiatric/Behavioral:  Negative for altered mental status and depression.          Past Medical History:   Diagnosis Date    Arthritis     CAD (coronary artery disease)     Heart attack (HCC) 2018    stent    High cholesterol     Hypertension     Pain     shoulders         Past Surgical History:   Procedure Laterality Date    PB SHLDR ARTHROSCOP,SURG,W/ROTAT CUFF REPB Right 2/20/2024    Procedure: Right Shoulder  arthroscopy with rotator cuff repair;  Surgeon: Ramila Marroquin M.D.;  Location: SURGERY [facility];  Service: Orthopedics    NM SHLDR ARTHROSCOP EXTEN DEBRIDE 3+ Right 2/20/2024    Procedure: ARTHROSCOPY, SHOULDER, WITH EXTENSIVE DEBRIDEMENT;  Surgeon: Ramila Marroquin M.D.;  Location: [facility];  Service: Orthopedics    BICEPS TENODESIS Right 2/20/2024    Procedure: TENODESIS, BICEPS;  Surgeon: Ramila Marroquin M.D.;  Location: SURGERY [facility];  Service: Orthopedics    SHOULDER DECOMPRESSION Right 2/20/2024    Procedure: DECOMPRESSION, SUBACROMIAL SPACE;  Surgeon: Ramila Marroquin M.D.;  Location: SURGERY [facility];  Service: Orthopedics    ARTHROPLASTY Right     unicomparmental    OTHER CARDIAC SURGERY      stent x 1    ROTATOR CUFF REPAIR Left     TONSILLECTOMY      UMBILICAL HERNIA REPAIR           Current Outpatient Medications   Medication Sig Dispense Refill    rosuvastatin (CRESTOR) 5 MG Tab Take 1 Tablet by mouth 3 times a week. 30 Tablet 3    Evolocumab (REPATHA) Solution Prefilled Syringe SubQ injection syringe Inject 1 mL under the skin every 14 days. 2 mL 6    lisinopril (PRINIVIL) 2.5 MG Tab Take 1 Tablet by mouth every day. 30 Tablet 3    cyclobenzaprine (FLEXERIL) 10 mg Tab Take 1 Tablet by mouth 1 time a day as needed for Muscle Spasms. 30 Tablet 1    oxyCODONE immediate-release (ROXICODONE) 5 MG Tab Take 1-2 Tablets by mouth every 6 hours as needed for Severe Pain for up to 7 days. Do not exceed 6 tabs in 24 hours 40 Tablet 0    ezetimibe (ZETIA) 10 MG Tab TAKE 1 TABLET BY MOUTH EVERY DAY 90 Tablet 3    cyclobenzaprine (FLEXERIL) 10 mg Tab TAKE 1 TABLET BY MOUTH EVERY DAY AS NEEDED FOR MUSCLE SPASMS 30 Tablet 1    metoprolol tartrate (LOPRESSOR) 25 MG Tab TAKE 1 TABLET BY MOUTH TWICE DAILY 180 Tablet 3    aspirin 81 MG EC tablet Take 1 Tablet by mouth every day. 90 Tablet 3    ondansetron (ZOFRAN) 4 MG Tab tablet Take 1 Tablet by mouth every four hours as  "needed for Nausea/Vomiting. (Patient not taking: Reported on 3/8/2024) 20 Tablet 0     No current facility-administered medications for this visit.         No Known Allergies      Family History   Problem Relation Age of Onset    Heart Failure Mother     Dementia Father     Parkinson's Disease Paternal Grandmother          Social History     Socioeconomic History    Marital status:      Spouse name: Not on file    Number of children: Not on file    Years of education: Not on file    Highest education level: Not on file   Occupational History    Not on file   Tobacco Use    Smoking status: Every Day     Current packs/day: 0.50     Average packs/day: 0.5 packs/day for 34.2 years (17.1 ttl pk-yrs)     Types: Cigarettes     Start date: 1/1/1990    Smokeless tobacco: Never   Vaping Use    Vaping Use: Some days    Substances: Flavoring   Substance and Sexual Activity    Alcohol use: Yes     Alcohol/week: 8.4 - 12.6 oz     Types: 14 - 21 Cans of beer per week     Comment: socially    Drug use: Not Currently     Types: Marijuana     Comment: THC/CBD cream    Sexual activity: Yes     Partners: Female     Comment: wife   Other Topics Concern    Not on file   Social History Narrative    Not on file     Social Determinants of Health     Financial Resource Strain: Not on file   Food Insecurity: Not on file   Transportation Needs: Not on file   Physical Activity: Not on file   Stress: Not on file   Social Connections: Not on file   Intimate Partner Violence: Not on file   Housing Stability: Not on file         Physical Exam:  Ambulatory Vitals  /80 (BP Location: Left arm, Patient Position: Sitting, BP Cuff Size: Adult)   Pulse 81   Resp 16   Ht 1.651 m (5' 5\")   Wt 71.2 kg (157 lb)   SpO2 94%    BP Readings from Last 4 Encounters:   03/08/24 118/80   02/20/24 (!) 147/81   01/09/24 108/62   12/28/23 110/70     Weight/BMI:   Vitals:    03/08/24 0934   BP: 118/80   Weight: 71.2 kg (157 lb)   Height: 1.651 m (5' 5\") "    Body mass index is 26.13 kg/m².  Wt Readings from Last 4 Encounters:   03/08/24 71.2 kg (157 lb)   02/20/24 76 kg (167 lb 7 oz)   01/09/24 78 kg (172 lb)   12/28/23 76 kg (167 lb 8.8 oz)       Physical Exam  Constitutional:       General: He is not in acute distress.  HENT:      Head: Normocephalic and atraumatic.   Eyes:      Conjunctiva/sclera: Conjunctivae normal.      Pupils: Pupils are equal, round, and reactive to light.   Neck:      Vascular: No JVD.   Cardiovascular:      Rate and Rhythm: Normal rate and regular rhythm.      Heart sounds: Normal heart sounds. No murmur heard.     No friction rub. No gallop.   Pulmonary:      Effort: Pulmonary effort is normal. No respiratory distress.      Breath sounds: Normal breath sounds. No wheezing or rales.   Chest:      Chest wall: No tenderness.   Abdominal:      General: Bowel sounds are normal. There is no distension.      Palpations: Abdomen is soft.   Musculoskeletal:      Cervical back: Normal range of motion and neck supple.   Skin:     General: Skin is warm and dry.   Neurological:      Mental Status: He is alert and oriented to person, place, and time.   Psychiatric:         Mood and Affect: Affect normal.         Judgment: Judgment normal.         Lab Data Review:  Lab Results   Component Value Date/Time    CHOLSTRLTOT 275 (H) 01/12/2024 06:40 AM     (H) 01/12/2024 06:40 AM    HDL 30 (A) 01/12/2024 06:40 AM    TRIGLYCERIDE 264 (H) 01/12/2024 06:40 AM       Lab Results   Component Value Date/Time    SODIUM 134 (L) 10/31/2022 08:53 AM    POTASSIUM 4.4 10/31/2022 08:53 AM    CHLORIDE 103 10/31/2022 08:53 AM    CO2 20 10/31/2022 08:53 AM    GLUCOSE 99 10/31/2022 08:53 AM    BUN 14 10/31/2022 08:53 AM    CREATININE 0.71 10/31/2022 08:53 AM     CrCl cannot be calculated (Patient's most recent lab result is older than the maximum 7 days allowed.).  Lab Results   Component Value Date/Time    ALKPHOSPHAT 67 10/31/2022 08:53 AM    ASTSGOT 16 10/31/2022  "08:53 AM    ALTSGPT 20 10/31/2022 08:53 AM    TBILIRUBIN 0.4 10/31/2022 08:53 AM      Lab Results   Component Value Date/Time    WBC 7.0 01/12/2024 06:40 AM     No results found for: \"HBA1C\"  No components found for: \"TROP\"      Cardiac Imaging and Procedures Review:      EKG 3/8/24 interpreted by me sinus 82 bpm, PACs,     Medical Decision Making:  Problem List Items Addressed This Visit       Coronary artery disease involving native coronary artery of native heart without angina pectoris    Relevant Medications    rosuvastatin (CRESTOR) 5 MG Tab    Evolocumab (REPATHA) Solution Prefilled Syringe SubQ injection syringe    lisinopril (PRINIVIL) 2.5 MG Tab    Other Relevant Orders    EKG (Completed)    S/P primary angioplasty with coronary stent    Mixed hyperlipidemia    Relevant Medications    rosuvastatin (CRESTOR) 5 MG Tab    Evolocumab (REPATHA) Solution Prefilled Syringe SubQ injection syringe    lisinopril (PRINIVIL) 2.5 MG Tab    Tobacco dependence due to cigarettes    Hypercholesterolemia with LDL greater than 190 mg/dL    Relevant Medications    rosuvastatin (CRESTOR) 5 MG Tab    Evolocumab (REPATHA) Solution Prefilled Syringe SubQ injection syringe    lisinopril (PRINIVIL) 2.5 MG Tab    Other Relevant Orders    Referral to Pharmacotherapy Service    Statin intolerance    Relevant Medications    rosuvastatin (CRESTOR) 5 MG Tab    Evolocumab (REPATHA) Solution Prefilled Syringe SubQ injection syringe    Essential hypertension, benign    Relevant Medications    rosuvastatin (CRESTOR) 5 MG Tab    Evolocumab (REPATHA) Solution Prefilled Syringe SubQ injection syringe    lisinopril (PRINIVIL) 2.5 MG Tab     CAD / PCI - asymptomatic. Continue aspirin and statin. Continue beta blocker. Add ACEi    HTN - goal <130/80. Add low dose lisinopril.    HLD - threshold goal LDL <70 and trig <150. Add low dose intermittent rosuvastatin and titrate as tolerated. Add repatha. Refer to pharmacotherapy.    Tobacco use - " discussed 8 min. Attempt nicoderm. Will hold off on wellbutrin for description possible christian symptoms. Follow up with primary care. Previously failed chantix.     It was my pleasure to meet with Antonio Canalesbenito.    A total of 66 minutes of time was spent on day of encounter reviewing medical record, performing history and examination, counseling, ordering medication/test/consults and documentation.

## 2024-03-10 DIAGNOSIS — Z78.9 STATIN INTOLERANCE: ICD-10-CM

## 2024-03-10 DIAGNOSIS — E78.00 HYPERCHOLESTEROLEMIA WITH LDL GREATER THAN 190 MG/DL: ICD-10-CM

## 2024-03-11 RX ORDER — ROSUVASTATIN CALCIUM 5 MG/1
5 TABLET, COATED ORAL
Qty: 38 TABLET | OUTPATIENT
Start: 2024-03-11

## 2024-03-11 NOTE — TELEPHONE ENCOUNTER
Denied refill request due to active rx at requested pharmacy, possible duplication, and/or patient needs appointment for further refills.

## 2024-03-13 ENCOUNTER — TELEMEDICINE (OUTPATIENT)
Dept: MEDICAL GROUP | Facility: PHYSICIAN GROUP | Age: 59
End: 2024-03-13
Payer: COMMERCIAL

## 2024-03-13 VITALS — BODY MASS INDEX: 27.82 KG/M2 | HEIGHT: 65 IN | WEIGHT: 167 LBS | RESPIRATION RATE: 14 BRPM

## 2024-03-13 DIAGNOSIS — M25.511 BILATERAL SHOULDER PAIN, UNSPECIFIED CHRONICITY: ICD-10-CM

## 2024-03-13 DIAGNOSIS — F17.210 TOBACCO DEPENDENCE DUE TO CIGARETTES: ICD-10-CM

## 2024-03-13 DIAGNOSIS — M25.512 BILATERAL SHOULDER PAIN, UNSPECIFIED CHRONICITY: ICD-10-CM

## 2024-03-13 PROCEDURE — 99214 OFFICE O/P EST MOD 30 MIN: CPT | Mod: 95 | Performed by: FAMILY MEDICINE

## 2024-03-13 RX ORDER — TRAMADOL HYDROCHLORIDE 50 MG/1
50 TABLET ORAL 2 TIMES DAILY PRN
Qty: 60 TABLET | Refills: 2 | Status: SHIPPED | OUTPATIENT
Start: 2024-03-13 | End: 2024-06-11

## 2024-03-13 RX ORDER — NICOTINE 21 MG/24HR
1 PATCH, TRANSDERMAL 24 HOURS TRANSDERMAL EVERY 24 HOURS
Qty: 30 PATCH | Refills: 3 | Status: SHIPPED | OUTPATIENT
Start: 2024-03-13

## 2024-03-13 ASSESSMENT — FIBROSIS 4 INDEX: FIB4 SCORE: 0.78

## 2024-03-13 NOTE — PROGRESS NOTES
Virtual Visit: Established Patient   This visit was conducted via Zoom using secure and encrypted videoconferencing technology.   The patient was in their home in the [facility].    The patient's identity was confirmed and verbal consent was obtained for this virtual visit.    Subjective:   CC:   Chief Complaint   Patient presents with    Shoulder Pain     Celio Santiago is a 59 y.o. male presenting for evaluation and management of:  Had Right shoulder arthroscopy with rotator cuff repair, biceps tenodesis, subacromial decompression, labral debridement, and subscapularis repair on 2/20/23.    His is planning on having the left shoulder operated on for a similar issue. Now with his right shoulder immobilized (post-operatively) he is having to use his left shoulder more and is getting a lot more pain  For pain, currently taking ibuprofen, tylenol, and flexeril as needed which together are ineffective. Was previously on tramadol 50 mg BID with good relieve.     Current medicines (including changes today)  Current Outpatient Medications   Medication Sig Dispense Refill    rosuvastatin (CRESTOR) 5 MG Tab Take 1 Tablet by mouth 3 times a week. 30 Tablet 3    Evolocumab (REPATHA) Solution Prefilled Syringe SubQ injection syringe Inject 1 mL under the skin every 14 days. 2 mL 6    lisinopril (PRINIVIL) 2.5 MG Tab Take 1 Tablet by mouth every day. 30 Tablet 3    ondansetron (ZOFRAN) 4 MG Tab tablet Take 1 Tablet by mouth every four hours as needed for Nausea/Vomiting. 20 Tablet 0    ezetimibe (ZETIA) 10 MG Tab TAKE 1 TABLET BY MOUTH EVERY DAY 90 Tablet 3    cyclobenzaprine (FLEXERIL) 10 mg Tab TAKE 1 TABLET BY MOUTH EVERY DAY AS NEEDED FOR MUSCLE SPASMS 30 Tablet 1    metoprolol tartrate (LOPRESSOR) 25 MG Tab TAKE 1 TABLET BY MOUTH TWICE DAILY 180 Tablet 3    aspirin 81 MG EC tablet Take 1 Tablet by mouth every day. 90 Tablet 3    cyclobenzaprine (FLEXERIL) 10 mg Tab Take 1 Tablet by mouth 1 time a day as needed for  "Muscle Spasms. 30 Tablet 1     No current facility-administered medications for this visit.       Patient Active Problem List    Diagnosis Date Noted    Essential hypertension, benign 03/08/2024    Traumatic tear of right rotator cuff, initial encounter 12/22/2023    Biceps tendonitis on right 12/22/2023    Glenoid labral tear, right, initial encounter 12/22/2023    Subacromial impingement of right shoulder 12/22/2023    Hypercholesterolemia with LDL greater than 190 mg/dL 02/06/2023    Statin intolerance 02/06/2023    Coronary artery disease involving native coronary artery of native heart without angina pectoris 10/07/2022    S/P primary angioplasty with coronary stent 10/07/2022    Mixed hyperlipidemia 10/07/2022    Umbilical hernia without obstruction and without gangrene 10/07/2022    Chronic pain of right knee 10/07/2022    Tobacco dependence due to cigarettes 10/07/2022        Objective:   Resp 14   Ht 1.651 m (5' 5\")   Wt 75.8 kg (167 lb)   BMI 27.79 kg/m²     Physical Exam  Constitutional: Alert, no distress, well-groomed  Psych: Alert and oriented x3, normal affect and mood.     Assessment and Plan:   1. Bilateral shoulder pain, unspecified chronicity  Chronic bilateral shoulder pain, uncontrolled.  History of multiple rotator cuff tears, planning on having upcoming left-sided shoulder arthroscopy, and is recently status post right-sided rotator cuff repair, biceps tenodesis, subacromial decompression, labral debridement, and subscapularis repair on 2/20/2023.  Pain currently uncontrolled on Tylenol, ibuprofen, and Flexeril.  After discussion decided to reinitiate tramadol 50 mg twice daily as needed.  Discussed risks and benefits of opioid prescription, follow-up every 3 months for controlled substance refill.  Controlled substance agreement on file.  - traMADol (ULTRAM) 50 MG Tab; Take 1 Tablet by mouth 2 times a day as needed for Severe Pain (shoulder pain) for up to 90 days.  Dispense: 60 Tablet; " Refill: 2    2. Tobacco dependence due to cigarettes  Chronic, uncontrolled, after discussion decided to initiate nicotine patch 14 mg daily.  He is trying to quit smoking, particularly with his wife's recent small cell lung cancer diagnosis  - nicotine (NICODERM) 14 MG/24HR PATCH 24 HR; Place 1 Patch on the skin every 24 hours.  Dispense: 30 Patch; Refill: 3

## 2024-03-14 ENCOUNTER — TELEPHONE (OUTPATIENT)
Dept: HEALTH INFORMATION MANAGEMENT | Facility: OTHER | Age: 59
End: 2024-03-14
Payer: COMMERCIAL

## 2024-04-11 ENCOUNTER — OFFICE VISIT (OUTPATIENT)
Dept: MEDICAL GROUP | Facility: PHYSICIAN GROUP | Age: 59
End: 2024-04-11
Payer: COMMERCIAL

## 2024-04-11 ENCOUNTER — TELEPHONE (OUTPATIENT)
Dept: VASCULAR LAB | Facility: MEDICAL CENTER | Age: 59
End: 2024-04-11

## 2024-04-11 VITALS — BODY MASS INDEX: 26.84 KG/M2 | HEART RATE: 98 BPM | HEIGHT: 66 IN | WEIGHT: 167 LBS | OXYGEN SATURATION: 94 %

## 2024-04-11 DIAGNOSIS — E78.01 FAMILIAL HYPERCHOLESTEROLEMIA: ICD-10-CM

## 2024-04-11 DIAGNOSIS — E78.00 HYPERCHOLESTEROLEMIA WITH LDL GREATER THAN 190 MG/DL: ICD-10-CM

## 2024-04-11 PROCEDURE — 99402 PREV MED CNSL INDIV APPRX 30: CPT | Performed by: FAMILY MEDICINE

## 2024-04-11 RX ORDER — ROSUVASTATIN CALCIUM 10 MG/1
10 TABLET, COATED ORAL EVERY EVENING
Qty: 30 TABLET | Refills: 11 | Status: SHIPPED | OUTPATIENT
Start: 2024-04-11 | End: 2024-04-29 | Stop reason: SDUPTHER

## 2024-04-11 ASSESSMENT — FIBROSIS 4 INDEX: FIB4 SCORE: 0.78

## 2024-04-11 NOTE — TELEPHONE ENCOUNTER
Received New start PA request via  EMR   for NEXLETOL 180MG TABLETS. (Quantity:90, Day Supply:90)     Insurance: [employer]   Member ID:  [id number]  BIN: [id number]  PCN: [id number]  Group: [id number]     Ran Test claim via [place] & medication  rejects stating exceptions non-formulary drug, contact prescriber.     Re-routing to provider and clinic staff to seek alternative.     CARMEN Santos, PhT  Vascular Pharmacy Liaison (Rx Coordinator)  P: [phone]  4/11/2024 3:14 PM

## 2024-04-11 NOTE — Clinical Note
Dr. Bloch, New lipid patient.  Starting bempedoic acid due to severe needle aversion  [place],  I sent bempedoic acid to the [facility].  Any help with PAs is always appreciated!!  Thanks, Andrei

## 2024-04-11 NOTE — PROGRESS NOTES
"CHF Pharmacotherapy visit:    Celio Santiago  1965    Informed written consent was given on: 04/11/24    Time  135pm -209pm     HPI  Celio Santiago is here for lipids   Pertinent Interval History since last visit:   New pt appt    Immunization History   Administered Date(s) Administered    Influenza Vaccine Quad Inj (Pf) 01/09/2024    Tdap Vaccine 10/07/2022       SOCIAL HISTORY  Social History     Tobacco Use   Smoking Status Every Day    Current packs/day: 0.50    Average packs/day: 0.5 packs/day for 34.3 years (17.1 ttl pk-yrs)    Types: Cigarettes    Start date: 1/1/1990   Smokeless Tobacco Never        Recent Imaging Studies:    None since last visit    DATA REVIEW  No results found for: \"INR\"  No results found for: \"POCINR\"   No results found for: \"HBA1C\"       Lab Results   Component Value Date/Time    CHOLSTRLTOT 275 (H) 01/12/2024 06:40 AM     (H) 01/12/2024 06:40 AM     (H) 10/31/2022 08:53 AM    HDL 30 (A) 01/12/2024 06:40 AM    TRIGLYCERIDE 264 (H) 01/12/2024 06:40 AM    TRIGLYCERIDE 242 (H) 10/31/2022 08:53 AM        Latest Reference Range & Units Most Recent   Cholesterol,Tot 100 - 199 mg/dL 275 (H)  1/12/24 06:40   Triglycerides 0 - 149 mg/dL 264 (H)  1/12/24 06:40   HDL >=40 mg/dL 30 !  1/12/24 06:40   LDL <100 mg/dL 192 (H)  1/12/24 06:40   Apolipoprotein-B 66 - 133 mg/dL 177 (H)  1/12/24 06:40   (H): Data is abnormally high  !: Data is abnormal      Current Outpatient Medications:     Bempedoic Acid, 180 mg, Oral, DAILY    rosuvastatin, 10 mg, Oral, Q EVENING    traMADol, 50 mg, Oral, BID PRN, PRN    nicotine, 1 Patch, Transdermal, Q24HRS, Taking    lisinopril, 2.5 mg, Oral, DAILY, Taking    ezetimibe, 10 mg, Oral, DAILY, Taking    cyclobenzaprine, TAKE 1 TABLET BY MOUTH EVERY DAY AS NEEDED FOR MUSCLE SPASMS, PRN    metoprolol tartrate, TAKE 1 TABLET BY MOUTH TWICE DAILY, Taking    aspirin, 81 mg, Oral, DAILY, Taking    Vitals:    04/11/24 1334   Pulse: 98   SpO2: 94% "       ASSESSMENT AND PLAN    Lipids:  LDL goal <55-70  Coronary artery disease with a stent  in 2018  High probability for familial hypercholesterolemia with a LDL greater than 190  [place] screening recommended  Risk factor with a apolipoprotein B of 177 mg/Cailin  Will order a lipo(a) when due for next labs  Athletic body type, with healthy Dash/Mediterranean style diet, currently working- except out for shoulder surgery  Needle aversion, but was open to Leqvio injections in clinic if needed, but would prefer orals  He said he will likely pass out if he injected himself  Currently taking Zetia 10 mg daily and rosuvastatin 5 mg 3 times a week  Bilateral shoulder pain due to recent rotator cuff surgery, and injury to other shoulder.   He is willing to increase the dose of rosuvastatin today    lipid medication:   Start bempedoic acid 180 mg daily  Sent to [facility] on [address]  Notification sent to our Rx coordinator  If he tolerates this medication we will combine with Zetia 10 mg at some point  Increase rosuvastatin to 10 mg daily   Can decrease the dose back to 5 mg daily and/or 3 times a week if muscle pain occurs  We will follow-up in 4 weeks to determine if muscle pain is due to statin and/or surgery.    If he does not experience significant muscle pain we will quickly escalate to rosuvastatin 20 or 40 mg  Continue Zetia 10 mg once daily  Consider in clinic Leqvio  Consider Repatha Pushtronex with in clinic administration once every 28 days  If his wife comes back to town she would be able to administer this, but currently out of town for cancer treatment  Addendum 4/15/2024:  Insurance would not cover bempedoic acid  Try Repatha Pushtronex, sent to the pharmacy today  Investigating if Leqvio is covered      Lifestyle   Lifestyle Recommendations From Today's Visit:   Diet: DASH diet  Continue to eat DASH/MED style diet.   Continue to exercise as tolerated.  Salt restriction to <2300mg daily      https://www.nhlbi.nih.gov/education/dash-eating-plan          Blood Work Ordered At Today's visit: none  Studies Ordered at Todays Visit:none  Follow-Up: 4 week(s)    Prasad Luke PharmD  [facility]  Phone: [phone], Fax: [phone]    This note was created using voice recognition software (Dragon). The accuracy of the dictation is limited by the abilities of the software. I have reviewed the note prior to signing, however some errors in grammar and context are still possible. If you have any questions related to this note please do not hesitate to contact our office.     CC:  TIM Eason Vlad, M.D.

## 2024-04-15 ENCOUNTER — TELEPHONE (OUTPATIENT)
Dept: PHARMACY | Facility: MEDICAL CENTER | Age: 59
End: 2024-04-15
Payer: COMMERCIAL

## 2024-04-15 RX ORDER — EVOLOCUMAB 420 MG/3.5
420 KIT SUBCUTANEOUS
Qty: 3.5 ML | Refills: 3 | Status: SHIPPED | OUTPATIENT
Start: 2024-04-15

## 2024-04-15 NOTE — TELEPHONE ENCOUNTER
Received New Start PA request via [employer]  for Repatha Pushtronex System 420MG/3.5ML on-body infusor. (Quantity:3.5 mL, Day Supply:28)     Insurance: [employer]  Member ID:  [id number]  BIN: [id number]  PCN: [id number]  Group: [id number]     Ran Test claim via [place] & medication Rejects stating prior authorization is required.    Thank you,  Shanta Rosado  Pharmacy Liaison  [facility] and [facility]  [phone]

## 2024-04-15 NOTE — TELEPHONE ENCOUNTER
Prior Authorization for Repatha Pushtronex System 420MG/3.5ML on-body infusor (Quantity: 3.5 mL, Days: 28) has been submitted via Cover My Meds: Key ([id number])    Insurance: [facility] / [employer]    Will follow up in 24-48 business hours.     Thank you,  Shanta Rosado  Pharmacy Liaison  [facility] and Vascular [facility]  [phone]

## 2024-04-16 ENCOUNTER — TELEPHONE (OUTPATIENT)
Dept: VASCULAR LAB | Facility: MEDICAL CENTER | Age: 59
End: 2024-04-16
Payer: COMMERCIAL

## 2024-04-16 ENCOUNTER — DOCUMENTATION (OUTPATIENT)
Dept: CARDIOLOGY | Facility: MEDICAL CENTER | Age: 59
End: 2024-04-16
Payer: COMMERCIAL

## 2024-04-16 PROCEDURE — RXMED WILLOW AMBULATORY MEDICATION CHARGE: Performed by: FAMILY MEDICINE

## 2024-04-16 NOTE — PROGRESS NOTES
New lipid management plan:    Bempedoic acid was not covered under his insurance at all    We are currently prioritizing getting coverage for Repatha Pushtronex, while simultaneously exploring Leqvio as a potential alternative.     We've placed an order for Repatha Pushtronex and are awaiting prior authorization. Although the process has started, we're yet to receive confirmation.     If the co-pay is reasonable and he feels comfortable self-administering or attending a monthly clinic appointment, we'll likely continue with this product.     Additionally, we're in the process of determining if Leqvio is covered. Since this may take some time, I opted to initiate treatment sooner rather than later.     Ideally, he'll adapt to using Pushtronex independently, eliminating the need for clinic visits. However, in the worst-case scenario, we'll have explored Leqvio as a viable option.     I called and left Celio a voice message explaining all of these options to him, and asked him to call us back if he had any concerns    Prasad Luke, PharmD, MS, BCACP, [facility] of Heart and Vascular Health  Phone: [phone]  Fax: [phone]  On call: [phone]  General scheduling/information [phone]  For emergencies please dial [phone]  Please do not use W[employer]bits for urgent matters, call the phone numbers listed above.  Witsbits messages are answered Monday through Friday.     This note was created using voice recognition software (Dragon). The accuracy of the dictation is limited by the abilities of the software. I have reviewed the note prior to signing, however some errors in grammar and context are still possible. If you have any questions related to this note please do not hesitate to contact our office.

## 2024-04-16 NOTE — TELEPHONE ENCOUNTER
[facility]    Left VM with pt to expect an email asking for permission to investigate benefits for leqvio and he will need to sign that email.    Requirements for Leqvio coverage:  Has Leqvio [facility] investigated cost: Form submitted today.   Does pt need a PA: Likely, but will await investigation.   When does the PA : n/a  Does pt qualify for copay card: TBD  When does the copay card : TBD  Has oncology financial counseling determine cost: No, referral to be placed once medication is approved.   What is the cost needed to obtain from pt prior to injection: TBD.    Mekhi Dupree, PharmD

## 2024-04-23 ENCOUNTER — DOCUMENTATION (OUTPATIENT)
Dept: CARDIOLOGY | Facility: MEDICAL CENTER | Age: 59
End: 2024-04-23
Payer: COMMERCIAL

## 2024-04-23 ENCOUNTER — PHARMACY VISIT (OUTPATIENT)
Dept: PHARMACY | Facility: MEDICAL CENTER | Age: 59
End: 2024-04-23
Payer: MEDICARE

## 2024-04-29 ENCOUNTER — OFFICE VISIT (OUTPATIENT)
Dept: MEDICAL GROUP | Facility: PHYSICIAN GROUP | Age: 59
End: 2024-04-29
Payer: COMMERCIAL

## 2024-04-29 VITALS — WEIGHT: 167 LBS | BODY MASS INDEX: 26.84 KG/M2 | HEIGHT: 66 IN | OXYGEN SATURATION: 97 % | HEART RATE: 74 BPM

## 2024-04-29 DIAGNOSIS — E78.00 HYPERCHOLESTEROLEMIA WITH LDL GREATER THAN 190 MG/DL: ICD-10-CM

## 2024-04-29 DIAGNOSIS — R73.01 IMPAIRED FASTING GLUCOSE: ICD-10-CM

## 2024-04-29 RX ORDER — ROSUVASTATIN CALCIUM 20 MG/1
20 TABLET, COATED ORAL EVERY EVENING
Qty: 90 TABLET | Refills: 1 | Status: SHIPPED | OUTPATIENT
Start: 2024-04-29

## 2024-04-29 ASSESSMENT — FIBROSIS 4 INDEX: FIB4 SCORE: 0.78

## 2024-04-29 NOTE — PROGRESS NOTES
"CHF Pharmacotherapy visit:    Celio Santiago  1965    Informed written consent was given on: 04/11/24    Time  711am   Time out 730am     HPI  Celio Santiago is here for lipids   Pertinent Interval History since last visit:   New pt appt    Immunization History   Administered Date(s) Administered    Influenza Vaccine Quad Inj (Pf) 01/09/2024    Tdap Vaccine 10/07/2022       SOCIAL HISTORY  Social History     Tobacco Use   Smoking Status Every Day    Current packs/day: 0.50    Average packs/day: 0.5 packs/day for 34.3 years (17.2 ttl pk-yrs)    Types: Cigarettes    Start date: 1/1/1990   Smokeless Tobacco Never        Recent Imaging Studies:    None since last visit    DATA REVIEW  No results found for: \"INR\"  No results found for: \"POCINR\"   No results found for: \"HBA1C\"       Lab Results   Component Value Date/Time    CHOLSTRLTOT 275 (H) 01/12/2024 06:40 AM     (H) 01/12/2024 06:40 AM     (H) 10/31/2022 08:53 AM    HDL 30 (A) 01/12/2024 06:40 AM    TRIGLYCERIDE 264 (H) 01/12/2024 06:40 AM    TRIGLYCERIDE 242 (H) 10/31/2022 08:53 AM        Latest Reference Range & Units Most Recent   Cholesterol,Tot 100 - 199 mg/dL 275 (H)  1/12/24 06:40   Triglycerides 0 - 149 mg/dL 264 (H)  1/12/24 06:40   HDL >=40 mg/dL 30 !  1/12/24 06:40   LDL <100 mg/dL 192 (H)  1/12/24 06:40   Apolipoprotein-B 66 - 133 mg/dL 177 (H)  1/12/24 06:40   (H): Data is abnormally high  !: Data is abnormal      Current Outpatient Medications:     rosuvastatin, 20 mg, Oral, Q EVENING    Repatha Pushtronex System, 420 mg, Subcutaneous, Q28 DAYS    traMADol, 50 mg, Oral, BID PRN    nicotine, 1 Patch, Transdermal, Q24HRS    lisinopril, 2.5 mg, Oral, DAILY    ezetimibe, 10 mg, Oral, DAILY    cyclobenzaprine, TAKE 1 TABLET BY MOUTH EVERY DAY AS NEEDED FOR MUSCLE SPASMS    metoprolol tartrate, TAKE 1 TABLET BY MOUTH TWICE DAILY    aspirin, 81 mg, Oral, DAILY      Vitals:    04/29/24 0719   Pulse: 74   SpO2: 97%   Weight: 75.8 kg (167 " "lb)   Height: 1.676 m (5' 6\")        ASSESSMENT AND PLAN    Lipids:  LDL goal <55-70  Coronary artery disease with a stent  in 2018  High probability for familial hypercholesterolemia with a LDL greater than 190  [place] screening recommended  Risk factor with a apolipoprotein B of 177 mg/Cailin  Will order a lipo(a) when due for next labs  Athletic body type, with healthy Dash/Mediterranean style diet, currently working- except out for shoulder surgery  Needle aversion, but was open to Leqvio injections in clinic, or Repatha Pushtronex  Bempedoic acid was not covered via insurance.     lipid medication:   Increase rosuvastatin to 20 mg daily   Continue Zetia 10 mg once daily  Start Repatha Pushtronex today, then once every 28 days  It was covered via a saleem for $5 per month, with the help of our Rx coordinators   His sister will help him with the first dose today     Lifestyle   Lifestyle Recommendations From Today's Visit:   Diet: DASH diet  Continue to eat DASH/MED style diet.   Continue to exercise as tolerated.  Salt restriction to <2300mg daily     https://www.nhlbi.nih.gov/education/dash-eating-plan          Blood Work Ordered At Today's visit: lipids, cmp, A1c, lipo(a)  Studies Ordered at Todays Visit:none  Follow-Up: 10 week(s)    Prasad Luke, PharmD  [facility] of Heart and Vascular Health  Phone: [phone], Fax: [phone]    This note was created using voice recognition software (Dragon). The accuracy of the dictation is limited by the abilities of the software. I have reviewed the note prior to signing, however some errors in grammar and context are still possible. If you have any questions related to this note please do not hesitate to contact our office.     CC:  Umesh Allen M.D.  Dave Doll M.D.    "

## 2024-05-06 ENCOUNTER — PATIENT MESSAGE (OUTPATIENT)
Dept: PHARMACY | Facility: MEDICAL CENTER | Age: 59
End: 2024-05-06
Payer: COMMERCIAL

## 2024-05-09 ENCOUNTER — DOCUMENTATION (OUTPATIENT)
Dept: PHARMACY | Facility: MEDICAL CENTER | Age: 59
End: 2024-05-09
Payer: COMMERCIAL

## 2024-05-14 ENCOUNTER — TELEPHONE (OUTPATIENT)
Dept: PHARMACY | Facility: MEDICAL CENTER | Age: 59
End: 2024-05-14
Payer: COMMERCIAL

## 2024-05-14 NOTE — TELEPHONE ENCOUNTER
5/14 -- Spoke with Celio, he mentioned that he will be leaving [place] for a week and will transfer a refill up to [facility] in [place] while he works up there. He won't be back in [place] until 5/27, and by that point he should have a week of medication left. We discussed that I will call him on 6/3 to see how he is after his dr's appt to follow up with his progress on the Repatha. Updated next call date for the pt.     Thank you,  Shanta Rosado  Pharmacy Liaison  [facility] and [facility]  [phone]

## 2024-05-17 ENCOUNTER — DOCUMENTATION (OUTPATIENT)
Dept: HEALTH INFORMATION MANAGEMENT | Facility: OTHER | Age: 59
End: 2024-05-17
Payer: COMMERCIAL

## 2024-05-28 PROCEDURE — RXMED WILLOW AMBULATORY MEDICATION CHARGE: Performed by: FAMILY MEDICINE

## 2024-05-30 ENCOUNTER — PHARMACY VISIT (OUTPATIENT)
Dept: PHARMACY | Facility: MEDICAL CENTER | Age: 59
End: 2024-05-30
Payer: MEDICARE

## 2024-06-04 ENCOUNTER — TELEPHONE (OUTPATIENT)
Dept: PHARMACY | Facility: MEDICAL CENTER | Age: 59
End: 2024-06-04
Payer: COMMERCIAL

## 2024-06-04 ENCOUNTER — DOCUMENTATION (OUTPATIENT)
Dept: CARDIOLOGY | Facility: MEDICAL CENTER | Age: 59
End: 2024-06-04
Payer: COMMERCIAL

## 2024-06-04 NOTE — TELEPHONE ENCOUNTER
Spoke with Celio regarding medication refill, he had mentioned some adverse side effects of feeling a fever for a few days after his injections and he was concerned. I messaged his provider, Andrei Luke, for any next steps if applicable and will call Celio in the meantime if we have updates.    Thank you,  Shanta Rosado  Pharmacy Liaison  [facility] and Vascular [facility]  [phone]

## 2024-06-12 DIAGNOSIS — I10 ESSENTIAL HYPERTENSION, BENIGN: ICD-10-CM

## 2024-06-12 NOTE — TELEPHONE ENCOUNTER
Is the patient due for a refill? Yes    Was the patient seen the past year? Yes    Date of last office visit: 3/8/24    Does the patient have an upcoming appointment?  Yes   If yes, When? 10/1/24    Provider to refill:VR    Does the patients insurance require a 100 day supply?  No

## 2024-06-13 RX ORDER — LISINOPRIL 2.5 MG/1
2.5 TABLET ORAL DAILY
Qty: 90 TABLET | Refills: 2 | Status: SHIPPED | OUTPATIENT
Start: 2024-06-13

## 2024-06-24 ENCOUNTER — TELEPHONE (OUTPATIENT)
Dept: MEDICAL GROUP | Facility: PHYSICIAN GROUP | Age: 59
End: 2024-06-24
Payer: COMMERCIAL

## 2024-06-24 NOTE — TELEPHONE ENCOUNTER
VOICEMAIL  1. Caller Name: Jennifer Santiago                      Call Back Number: [phone] (home)       2. Message: Pt wife is calling stating that Celio is ready for his 7mg dosage for his nicotine patches     3. Patient approves office to leave a detailed voicemail/MyChart message: yes

## 2024-09-09 ENCOUNTER — DOCUMENTATION (OUTPATIENT)
Dept: MEDICAL GROUP | Facility: PHYSICIAN GROUP | Age: 59
End: 2024-09-09
Payer: COMMERCIAL

## 2024-09-09 NOTE — PROGRESS NOTES
Left Celio a [email or web address] message to determine if he was able to start the injectable Repatha    Will follow-up with him again in approximately 2 weeks, to determine if he is read this message and/or responded

## 2024-09-24 ENCOUNTER — DOCUMENTATION (OUTPATIENT)
Dept: CARDIOLOGY | Facility: MEDICAL CENTER | Age: 59
End: 2024-09-24
Payer: COMMERCIAL

## 2024-11-04 ENCOUNTER — DOCUMENTATION (OUTPATIENT)
Dept: MEDICAL GROUP | Facility: PHYSICIAN GROUP | Age: 59
End: 2024-11-04

## 2024-11-04 NOTE — PROGRESS NOTES
Patient did not show for their appointment.      I left a voice message for them to call us back to reschedule.    [facility] of [facility] and Vascular Health  Phone [phone] fax [phone]

## 2024-12-10 ENCOUNTER — PATIENT MESSAGE (OUTPATIENT)
Dept: CARDIOLOGY | Facility: MEDICAL CENTER | Age: 59
End: 2024-12-10
Payer: COMMERCIAL

## 2024-12-10 DIAGNOSIS — I10 ESSENTIAL HYPERTENSION, BENIGN: ICD-10-CM

## 2024-12-10 DIAGNOSIS — I25.10 CORONARY ARTERY DISEASE INVOLVING NATIVE CORONARY ARTERY OF NATIVE HEART WITHOUT ANGINA PECTORIS: ICD-10-CM

## 2024-12-10 NOTE — TELEPHONE ENCOUNTER
VR- Okay to refill these three medications below under your name? Was previously prescribed by PCP. Thank you!

## 2024-12-11 RX ORDER — METOPROLOL TARTRATE 25 MG/1
25 TABLET, FILM COATED ORAL 2 TIMES DAILY
Qty: 180 TABLET | Refills: 3 | Status: SHIPPED | OUTPATIENT
Start: 2024-12-11

## 2024-12-11 RX ORDER — EZETIMIBE 10 MG/1
10 TABLET ORAL DAILY
Qty: 90 TABLET | Refills: 3 | Status: SHIPPED | OUTPATIENT
Start: 2024-12-11

## 2024-12-11 RX ORDER — ROSUVASTATIN CALCIUM 20 MG/1
20 TABLET, COATED ORAL EVERY EVENING
Qty: 90 TABLET | Refills: 1 | Status: SHIPPED | OUTPATIENT
Start: 2024-12-11

## 2025-01-06 ENCOUNTER — DOCUMENTATION (OUTPATIENT)
Dept: MEDICAL GROUP | Facility: PHYSICIAN GROUP | Age: 60
End: 2025-01-06
Payer: COMMERCIAL

## 2025-01-06 ENCOUNTER — PATIENT MESSAGE (OUTPATIENT)
Dept: MEDICAL GROUP | Facility: PHYSICIAN GROUP | Age: 60
End: 2025-01-06
Payer: COMMERCIAL

## 2025-01-06 NOTE — PROGRESS NOTES
Left voice message, and sent MyChart message.  Trying to determine if he is established with a new provider and if he is still tolerating Repatha without issue.     Prasad Luke, PharmD, MS, BCACP, [facility] of Heart and Vascular Health  Phone: [phone]  Fax: [phone]  On call: [phone]  General scheduling/information [phone]  For emergencies please dial [phone]  Please do not use [employer] for urgent matters, call the phone numbers listed above.  MyChart messages are answered Monday through Friday.     This note was created using voice recognition software (Dragon). The accuracy of the dictation is limited by the abilities of the software. I have reviewed the note prior to signing, however some errors in grammar and context are still possible. If you have any questions related to this note please do not hesitate to contact our office.

## 2025-01-07 NOTE — PROGRESS NOTES
Patient still waiting for a specialist in [place] for dyslipidemia and Repatha management.    We will discharge from clinic at this time as we are not able to prescribe or manage this patient    Prasad Luke, PharmD, MS, BCACP, [facility] of Heart and Vascular Health  Phone: [phone]  Fax: [phone]  On call: [phone]  General scheduling/information [phone]  For emergencies please dial [phone]  Please do not use TapDog for urgent matters, call the phone numbers listed above.  TapDog messages are answered Monday through Friday.     This note was created using voice recognition software (Dragon). The accuracy of the dictation is limited by the abilities of the software. I have reviewed the note prior to signing, however some errors in grammar and context are still possible. If you have any questions related to this note please do not hesitate to contact our office.

## 2025-01-07 NOTE — TELEPHONE ENCOUNTER
Received request via: Pharmacy    Was the patient seen in the last year in this department? Yes    Does the patient have an active prescription (recently filled or refills available) for medication(s) requested? No    Does the patient have correction Plus and need 100 day supply (blood pressure, diabetes and cholesterol meds only)? Patient does not have SCP  
07-Jan-2025 12:04

## 2025-03-21 DIAGNOSIS — I25.10 CORONARY ARTERY DISEASE INVOLVING NATIVE CORONARY ARTERY OF NATIVE HEART WITHOUT ANGINA PECTORIS: ICD-10-CM

## 2025-04-24 ENCOUNTER — TELEPHONE (OUTPATIENT)
Dept: CARDIOLOGY | Facility: MEDICAL CENTER | Age: 60
End: 2025-04-24
Payer: COMMERCIAL

## 2025-06-16 DIAGNOSIS — I25.10 CORONARY ARTERY DISEASE INVOLVING NATIVE CORONARY ARTERY OF NATIVE HEART WITHOUT ANGINA PECTORIS: ICD-10-CM

## 2025-06-16 RX ORDER — ROSUVASTATIN CALCIUM 20 MG/1
20 TABLET, COATED ORAL EVERY EVENING
Qty: 90 TABLET | Refills: 1 | OUTPATIENT
Start: 2025-06-16

## 2025-06-18 ENCOUNTER — TELEPHONE (OUTPATIENT)
Dept: CARDIOLOGY | Facility: MEDICAL CENTER | Age: 60
End: 2025-06-18
Payer: COMMERCIAL

## (undated) DEVICE — SHAVER 5.5 RESECTOR FORMULA (5EA/BX )

## (undated) DEVICE — CHLORAPREP 26 ML APPLICATOR - ORANGE TINT(25/CA)

## (undated) DEVICE — SPIDER SHOULDER HOLDER (12EA/BX)

## (undated) DEVICE — SUTURE GENERAL

## (undated) DEVICE — ABLATOR WAND SERFAS 90-S CRUISE

## (undated) DEVICE — SUTURE PASSER SELF CAPTURE

## (undated) DEVICE — TUBING CASSETTE CROSSFLOW INTEGRATED (10EA/CA)

## (undated) DEVICE — GLOVE BIOGEL INDICATOR SZ 7SURGICAL PF LTX - (50/BX 4BX/CA)

## (undated) DEVICE — TUBING DAY USE W/CARTRIDGE (10EA/BX)

## (undated) DEVICE — PENCIL ELECTSURG 10FT HLSTR - WITH BLADE (50EA/CA)

## (undated) DEVICE — SENSOR OXIMETER ADULT SPO2 RD SET (20EA/BX)

## (undated) DEVICE — GOWN SURGEONS X-LARGE - DISP. (30/CA)

## (undated) DEVICE — PACK SHOULDER ARTHROSCOPY SM - (2EA/CA)

## (undated) DEVICE — DRAPE U SPLIT IMP 54 X 76 - (24/CA)

## (undated) DEVICE — SUTURE 3-0 MONOCRYL PLUS PS-1 - 27 INCH (36/BX)

## (undated) DEVICE — SYRINGE ASEPTO - (50EA/CA

## (undated) DEVICE — Device

## (undated) DEVICE — PEROXIDE HYDROGEN 3% 32 OZ. (12EA/BX)

## (undated) DEVICE — SUTURE 3-0 PROLENE PS-1 (12PK/BX)

## (undated) DEVICE — CANNULA FULLY THREADED 8 X 75 (5EA/BX)

## (undated) DEVICE — DRAPE IOBAN II INCISE 23X17 - (10EA/BX 4BX/CA)

## (undated) DEVICE — COVER LIGHT HANDLE FLEXIBLE - SOFT (2EA/PK 80PK/CA)

## (undated) DEVICE — SODIUM CHL. IRRIGATION 0.9% 3000ML (4EA/CA 65CA/PF)

## (undated) DEVICE — CLOSURE SKIN STRIP 1/2 X 4 IN - (STERI STRIP) (50/BX 4BX/CA)

## (undated) DEVICE — DRAPETIBURON SHOULDER W/POUCH - (5EA/CA)

## (undated) DEVICE — GLOVE BIOGEL SZ 7 SURGICAL PF LTX - (50PR/BX 4BX/CA)

## (undated) DEVICE — TOWEL STOP TIMEOUT SAFETY FLAG (40EA/CA)